# Patient Record
Sex: MALE | Race: WHITE | Employment: FULL TIME | URBAN - METROPOLITAN AREA
[De-identification: names, ages, dates, MRNs, and addresses within clinical notes are randomized per-mention and may not be internally consistent; named-entity substitution may affect disease eponyms.]

---

## 2018-08-16 ENCOUNTER — TELEPHONE (OUTPATIENT)
Dept: CARDIOLOGY CLINIC | Age: 63
End: 2018-08-16

## 2018-08-16 NOTE — TELEPHONE ENCOUNTER
Patient called stating he is having an upper GI. He is inquiring whether or not he needs antibiotic prophylaxis. Please advise.

## 2018-09-28 ENCOUNTER — HOSPITAL ENCOUNTER (OUTPATIENT)
Age: 63
Discharge: HOME | End: 2018-09-30
Payer: COMMERCIAL

## 2018-09-28 PROCEDURE — 88305 TISSUE EXAM BY PATHOLOGIST: CPT

## 2018-09-28 PROCEDURE — 87081 CULTURE SCREEN ONLY: CPT

## 2018-09-29 LAB — CLOTEST: NORMAL

## 2018-12-28 ENCOUNTER — HOSPITAL ENCOUNTER (OUTPATIENT)
Age: 63
Discharge: HOME | End: 2018-12-30

## 2018-12-28 PROCEDURE — 88305 TISSUE EXAM BY PATHOLOGIST: CPT

## 2018-12-28 PROCEDURE — 87081 CULTURE SCREEN ONLY: CPT

## 2018-12-29 LAB — CLOTEST: NORMAL

## 2019-01-08 ENCOUNTER — TELEPHONE (OUTPATIENT)
Dept: CARDIOLOGY CLINIC | Age: 64
End: 2019-01-08

## 2019-01-08 DIAGNOSIS — I35.0 NONRHEUMATIC AORTIC VALVE STENOSIS: ICD-10-CM

## 2019-01-08 DIAGNOSIS — Q23.1 BICUSPID AORTIC VALVE: Primary | ICD-10-CM

## 2019-03-04 ENCOUNTER — HOSPITAL ENCOUNTER (OUTPATIENT)
Dept: CARDIOLOGY | Age: 64
Discharge: HOME | End: 2019-03-04
Payer: COMMERCIAL

## 2019-03-04 DIAGNOSIS — I35.0 NONRHEUMATIC AORTIC VALVE STENOSIS: ICD-10-CM

## 2019-03-04 DIAGNOSIS — Q23.1 BICUSPID AORTIC VALVE: ICD-10-CM

## 2019-03-04 LAB
LV EF: 60 %
LVEF MODALITY: NORMAL

## 2019-03-04 PROCEDURE — 93306 TTE W/DOPPLER COMPLETE: CPT

## 2019-03-13 ENCOUNTER — TELEPHONE (OUTPATIENT)
Dept: CARDIOLOGY CLINIC | Age: 64
End: 2019-03-13

## 2019-03-13 DIAGNOSIS — I10 HYPERTENSION, UNSPECIFIED TYPE: ICD-10-CM

## 2019-03-13 DIAGNOSIS — R93.1 ABNORMAL ECHOCARDIOGRAM: Primary | ICD-10-CM

## 2019-03-13 DIAGNOSIS — I77.810 DILATED AORTIC ROOT (HCC): ICD-10-CM

## 2019-03-15 ENCOUNTER — HOSPITAL ENCOUNTER (OUTPATIENT)
Age: 64
Discharge: HOME | End: 2019-03-15
Payer: COMMERCIAL

## 2019-03-15 ENCOUNTER — HOSPITAL ENCOUNTER (OUTPATIENT)
Dept: CT IMAGING | Age: 64
Discharge: HOME | End: 2019-03-17
Payer: COMMERCIAL

## 2019-03-15 DIAGNOSIS — I77.810 DILATED AORTIC ROOT (HCC): ICD-10-CM

## 2019-03-15 DIAGNOSIS — R93.1 ABNORMAL ECHOCARDIOGRAM: ICD-10-CM

## 2019-03-15 DIAGNOSIS — I10 HYPERTENSION, UNSPECIFIED TYPE: ICD-10-CM

## 2019-03-15 LAB
ANION GAP SERPL CALCULATED.3IONS-SCNC: 10 MMOL/L (ref 7–16)
BUN BLDV-MCNC: 17 MG/DL (ref 8–23)
CALCIUM SERPL-MCNC: 9.2 MG/DL (ref 8.6–10.2)
CHLORIDE BLD-SCNC: 99 MMOL/L (ref 98–107)
CO2: 29 MMOL/L (ref 22–29)
CREAT SERPL-MCNC: 1.1 MG/DL (ref 0.7–1.2)
GFR AFRICAN AMERICAN: >60
GFR NON-AFRICAN AMERICAN: >60 ML/MIN/1.73
GLUCOSE BLD-MCNC: 87 MG/DL (ref 74–99)
POTASSIUM SERPL-SCNC: 4.4 MMOL/L (ref 3.5–5)
SODIUM BLD-SCNC: 138 MMOL/L (ref 132–146)

## 2019-03-15 PROCEDURE — 6360000004 HC RX CONTRAST MEDICATION: Performed by: RADIOLOGY

## 2019-03-15 PROCEDURE — 36415 COLL VENOUS BLD VENIPUNCTURE: CPT

## 2019-03-15 PROCEDURE — 80048 BASIC METABOLIC PNL TOTAL CA: CPT

## 2019-03-15 PROCEDURE — 71275 CT ANGIOGRAPHY CHEST: CPT

## 2019-03-15 RX ADMIN — IOPAMIDOL 80 ML: 755 INJECTION, SOLUTION INTRAVENOUS at 16:12

## 2019-03-18 ENCOUNTER — TELEPHONE (OUTPATIENT)
Dept: CARDIOLOGY CLINIC | Age: 64
End: 2019-03-18

## 2019-03-18 PROBLEM — I71.20 THORACIC AORTIC ANEURYSM WITHOUT RUPTURE (HCC): Status: ACTIVE | Noted: 2019-03-18

## 2019-07-09 ENCOUNTER — OFFICE VISIT (OUTPATIENT)
Dept: FAMILY MEDICINE CLINIC | Age: 64
End: 2019-07-09
Payer: COMMERCIAL

## 2019-07-09 VITALS
SYSTOLIC BLOOD PRESSURE: 120 MMHG | HEIGHT: 74 IN | WEIGHT: 193.6 LBS | DIASTOLIC BLOOD PRESSURE: 78 MMHG | HEART RATE: 63 BPM | TEMPERATURE: 96.2 F | BODY MASS INDEX: 24.85 KG/M2

## 2019-07-09 DIAGNOSIS — R19.7 DIARRHEA, UNSPECIFIED TYPE: Primary | ICD-10-CM

## 2019-07-09 PROCEDURE — 99213 OFFICE O/P EST LOW 20 MIN: CPT | Performed by: PHYSICIAN ASSISTANT

## 2019-07-09 RX ORDER — DICYCLOMINE HCL 20 MG
20 TABLET ORAL 3 TIMES DAILY PRN
Qty: 20 TABLET | Refills: 0 | Status: SHIPPED | OUTPATIENT
Start: 2019-07-09 | End: 2019-10-14 | Stop reason: ALTCHOICE

## 2019-07-09 RX ORDER — ONDANSETRON 4 MG/1
4 TABLET, FILM COATED ORAL DAILY PRN
Qty: 12 TABLET | Refills: 0 | Status: SHIPPED | OUTPATIENT
Start: 2019-07-09 | End: 2019-10-14 | Stop reason: ALTCHOICE

## 2019-10-14 ENCOUNTER — HOSPITAL ENCOUNTER (OUTPATIENT)
Age: 64
Discharge: HOME | End: 2019-10-16
Payer: COMMERCIAL

## 2019-10-14 ENCOUNTER — OFFICE VISIT (OUTPATIENT)
Dept: PRIMARY CARE CLINIC | Age: 64
End: 2019-10-14
Payer: COMMERCIAL

## 2019-10-14 VITALS
WEIGHT: 200 LBS | HEART RATE: 52 BPM | OXYGEN SATURATION: 96 % | DIASTOLIC BLOOD PRESSURE: 76 MMHG | TEMPERATURE: 97 F | BODY MASS INDEX: 25.68 KG/M2 | SYSTOLIC BLOOD PRESSURE: 120 MMHG

## 2019-10-14 DIAGNOSIS — I71.20 THORACIC AORTIC ANEURYSM WITHOUT RUPTURE: ICD-10-CM

## 2019-10-14 DIAGNOSIS — E78.5 HYPERLIPIDEMIA, UNSPECIFIED HYPERLIPIDEMIA TYPE: ICD-10-CM

## 2019-10-14 DIAGNOSIS — I35.0 NONRHEUMATIC AORTIC VALVE STENOSIS: ICD-10-CM

## 2019-10-14 DIAGNOSIS — I10 ESSENTIAL HYPERTENSION: ICD-10-CM

## 2019-10-14 DIAGNOSIS — R31.21 ASYMPTOMATIC MICROSCOPIC HEMATURIA: ICD-10-CM

## 2019-10-14 DIAGNOSIS — Z12.11 SPECIAL SCREENING FOR MALIGNANT NEOPLASMS, COLON: ICD-10-CM

## 2019-10-14 DIAGNOSIS — Q23.1 BICUSPID AORTIC VALVE: ICD-10-CM

## 2019-10-14 DIAGNOSIS — K21.00 GASTROESOPHAGEAL REFLUX DISEASE WITH ESOPHAGITIS: ICD-10-CM

## 2019-10-14 DIAGNOSIS — Z00.00 ANNUAL PHYSICAL EXAM: ICD-10-CM

## 2019-10-14 DIAGNOSIS — Z23 NEED FOR PNEUMOCOCCAL VACCINATION: Primary | ICD-10-CM

## 2019-10-14 LAB
ALBUMIN SERPL-MCNC: 4.2 G/DL (ref 3.5–5.2)
ALP BLD-CCNC: 95 U/L (ref 40–129)
ALT SERPL-CCNC: 14 U/L (ref 0–40)
ANION GAP SERPL CALCULATED.3IONS-SCNC: 13 MMOL/L (ref 7–16)
AST SERPL-CCNC: 22 U/L (ref 0–39)
BASOPHILS ABSOLUTE: 0.08 E9/L (ref 0–0.2)
BASOPHILS RELATIVE PERCENT: 1.6 % (ref 0–2)
BILIRUB SERPL-MCNC: 0.7 MG/DL (ref 0–1.2)
BILIRUBIN, POC: NORMAL
BLOOD URINE, POC: NORMAL
BUN BLDV-MCNC: 13 MG/DL (ref 8–23)
CALCIUM SERPL-MCNC: 9.2 MG/DL (ref 8.6–10.2)
CHLORIDE BLD-SCNC: 103 MMOL/L (ref 98–107)
CHOLESTEROL, TOTAL: 175 MG/DL (ref 0–199)
CLARITY, POC: CLEAR
CO2: 24 MMOL/L (ref 22–29)
COLOR, POC: YELLOW
CREAT SERPL-MCNC: 1.1 MG/DL (ref 0.7–1.2)
EOSINOPHILS ABSOLUTE: 0.1 E9/L (ref 0.05–0.5)
EOSINOPHILS RELATIVE PERCENT: 2 % (ref 0–6)
GFR AFRICAN AMERICAN: >60
GFR NON-AFRICAN AMERICAN: >60 ML/MIN/1.73
GLUCOSE BLD-MCNC: 102 MG/DL (ref 74–99)
GLUCOSE URINE, POC: NORMAL
HCT VFR BLD CALC: 47 % (ref 37–54)
HDLC SERPL-MCNC: 54 MG/DL
HEMOGLOBIN: 14.9 G/DL (ref 12.5–16.5)
IMMATURE GRANULOCYTES #: 0.01 E9/L
IMMATURE GRANULOCYTES %: 0.2 % (ref 0–5)
KETONES, POC: NORMAL
LDL CHOLESTEROL CALCULATED: 102 MG/DL (ref 0–99)
LEUKOCYTE EST, POC: NORMAL
LYMPHOCYTES ABSOLUTE: 1.47 E9/L (ref 1.5–4)
LYMPHOCYTES RELATIVE PERCENT: 29.2 % (ref 20–42)
MCH RBC QN AUTO: 31.8 PG (ref 26–35)
MCHC RBC AUTO-ENTMCNC: 31.7 % (ref 32–34.5)
MCV RBC AUTO: 100.2 FL (ref 80–99.9)
MONOCYTES ABSOLUTE: 0.57 E9/L (ref 0.1–0.95)
MONOCYTES RELATIVE PERCENT: 11.3 % (ref 2–12)
NEUTROPHILS ABSOLUTE: 2.8 E9/L (ref 1.8–7.3)
NEUTROPHILS RELATIVE PERCENT: 55.7 % (ref 43–80)
NITRITE, POC: NORMAL
PDW BLD-RTO: 12.5 FL (ref 11.5–15)
PH, POC: 6
PLATELET # BLD: 206 E9/L (ref 130–450)
PMV BLD AUTO: 11.4 FL (ref 7–12)
POTASSIUM SERPL-SCNC: 5 MMOL/L (ref 3.5–5)
PROSTATE SPECIFIC ANTIGEN: 0.44 NG/ML (ref 0–4)
PROTEIN, POC: NORMAL
RBC # BLD: 4.69 E12/L (ref 3.8–5.8)
SODIUM BLD-SCNC: 140 MMOL/L (ref 132–146)
SPECIFIC GRAVITY, POC: 1.02
T4 TOTAL: 6.9 MCG/DL (ref 4.5–11.7)
TOTAL PROTEIN: 6.9 G/DL (ref 6.4–8.3)
TRIGL SERPL-MCNC: 95 MG/DL (ref 0–149)
TSH SERPL DL<=0.05 MIU/L-ACNC: 2.32 UIU/ML (ref 0.27–4.2)
UROBILINOGEN, POC: 0.2
VLDLC SERPL CALC-MCNC: 19 MG/DL
WBC # BLD: 5 E9/L (ref 4.5–11.5)

## 2019-10-14 PROCEDURE — 81002 URINALYSIS NONAUTO W/O SCOPE: CPT | Performed by: FAMILY MEDICINE

## 2019-10-14 PROCEDURE — 36415 COLL VENOUS BLD VENIPUNCTURE: CPT

## 2019-10-14 PROCEDURE — 85025 COMPLETE CBC W/AUTO DIFF WBC: CPT

## 2019-10-14 PROCEDURE — 93000 ELECTROCARDIOGRAM COMPLETE: CPT | Performed by: FAMILY MEDICINE

## 2019-10-14 PROCEDURE — 84436 ASSAY OF TOTAL THYROXINE: CPT

## 2019-10-14 PROCEDURE — 84443 ASSAY THYROID STIM HORMONE: CPT

## 2019-10-14 PROCEDURE — 80053 COMPREHEN METABOLIC PANEL: CPT

## 2019-10-14 PROCEDURE — 90670 PCV13 VACCINE IM: CPT | Performed by: FAMILY MEDICINE

## 2019-10-14 PROCEDURE — 80061 LIPID PANEL: CPT

## 2019-10-14 PROCEDURE — G0103 PSA SCREENING: HCPCS

## 2019-10-14 PROCEDURE — 99397 PER PM REEVAL EST PAT 65+ YR: CPT | Performed by: FAMILY MEDICINE

## 2019-10-14 PROCEDURE — 90471 IMMUNIZATION ADMIN: CPT | Performed by: FAMILY MEDICINE

## 2019-10-14 RX ORDER — PANTOPRAZOLE SODIUM 40 MG/1
TABLET, DELAYED RELEASE ORAL
Refills: 2 | COMMUNITY
Start: 2019-09-14 | End: 2019-11-18 | Stop reason: SDUPTHER

## 2019-10-14 RX ORDER — TERAZOSIN 5 MG/1
5 CAPSULE ORAL NIGHTLY
Qty: 90 CAPSULE | Refills: 3 | Status: SHIPPED
Start: 2019-10-14 | End: 2020-04-16 | Stop reason: SDUPTHER

## 2019-10-14 RX ORDER — METOPROLOL SUCCINATE 25 MG/1
25 TABLET, EXTENDED RELEASE ORAL DAILY
Qty: 90 TABLET | Refills: 3 | Status: SHIPPED
Start: 2019-10-14 | End: 2020-04-16 | Stop reason: SDUPTHER

## 2019-10-14 RX ORDER — ATORVASTATIN CALCIUM 10 MG/1
10 TABLET, FILM COATED ORAL DAILY
Qty: 90 TABLET | Refills: 3 | Status: SHIPPED
Start: 2019-10-14 | End: 2020-04-16 | Stop reason: SDUPTHER

## 2019-10-14 ASSESSMENT — PATIENT HEALTH QUESTIONNAIRE - PHQ9
SUM OF ALL RESPONSES TO PHQ QUESTIONS 1-9: 0
1. LITTLE INTEREST OR PLEASURE IN DOING THINGS: 0
SUM OF ALL RESPONSES TO PHQ9 QUESTIONS 1 & 2: 0
SUM OF ALL RESPONSES TO PHQ QUESTIONS 1-9: 0
2. FEELING DOWN, DEPRESSED OR HOPELESS: 0

## 2019-10-14 ASSESSMENT — ENCOUNTER SYMPTOMS
ALLERGIC/IMMUNOLOGIC NEGATIVE: 1
EYES NEGATIVE: 1
GASTROINTESTINAL NEGATIVE: 1
RESPIRATORY NEGATIVE: 1

## 2019-11-13 PROBLEM — Z12.11 SPECIAL SCREENING FOR MALIGNANT NEOPLASMS, COLON: Status: RESOLVED | Noted: 2019-10-14 | Resolved: 2019-11-13

## 2019-11-18 ENCOUNTER — TELEPHONE (OUTPATIENT)
Dept: PRIMARY CARE CLINIC | Age: 64
End: 2019-11-18

## 2019-11-18 DIAGNOSIS — M25.561 PAIN IN BOTH KNEES, UNSPECIFIED CHRONICITY: Primary | ICD-10-CM

## 2019-11-18 DIAGNOSIS — M25.562 PAIN IN BOTH KNEES, UNSPECIFIED CHRONICITY: Primary | ICD-10-CM

## 2019-11-18 RX ORDER — PANTOPRAZOLE SODIUM 40 MG/1
TABLET, DELAYED RELEASE ORAL
Qty: 30 TABLET | Refills: 5 | Status: SHIPPED
Start: 2019-11-18 | End: 2020-04-16 | Stop reason: SDUPTHER

## 2020-04-16 RX ORDER — ATORVASTATIN CALCIUM 10 MG/1
10 TABLET, FILM COATED ORAL DAILY
Qty: 90 TABLET | Refills: 3 | Status: SHIPPED
Start: 2020-04-16 | End: 2021-04-05

## 2020-04-16 RX ORDER — PANTOPRAZOLE SODIUM 40 MG/1
40 TABLET, DELAYED RELEASE ORAL DAILY
Qty: 90 TABLET | Refills: 3 | Status: SHIPPED
Start: 2020-04-16 | End: 2021-04-05

## 2020-04-16 RX ORDER — TERAZOSIN 5 MG/1
5 CAPSULE ORAL NIGHTLY
Qty: 90 CAPSULE | Refills: 3 | Status: SHIPPED
Start: 2020-04-16 | End: 2021-04-05

## 2020-04-16 RX ORDER — METOPROLOL SUCCINATE 25 MG/1
25 TABLET, EXTENDED RELEASE ORAL DAILY
Qty: 90 TABLET | Refills: 3 | Status: SHIPPED
Start: 2020-04-16 | End: 2021-12-20 | Stop reason: SDUPTHER

## 2020-06-09 ENCOUNTER — TELEPHONE (OUTPATIENT)
Dept: ADMINISTRATIVE | Age: 65
End: 2020-06-09

## 2020-06-29 ENCOUNTER — TELEPHONE (OUTPATIENT)
Dept: CARDIOLOGY | Age: 65
End: 2020-06-29

## 2020-06-29 NOTE — TELEPHONE ENCOUNTER
SCHEDULED ECHO FOR 07-27-20. REVIEWED COVID CHECKLIST WITH PATIENT.   OFFICE VISIT WITH DR. Annabella Brown AT 8:45AM  Electronically signed by Katrina Rodgers on 6/29/2020 at 8:48 AM

## 2020-07-23 ENCOUNTER — TELEPHONE (OUTPATIENT)
Dept: CARDIOLOGY | Age: 65
End: 2020-07-23

## 2020-07-27 ENCOUNTER — OFFICE VISIT (OUTPATIENT)
Dept: CARDIOLOGY CLINIC | Age: 65
End: 2020-07-27
Payer: MEDICARE

## 2020-07-27 ENCOUNTER — HOSPITAL ENCOUNTER (OUTPATIENT)
Dept: CARDIOLOGY | Age: 65
Discharge: HOME | End: 2020-07-27
Payer: MEDICARE

## 2020-07-27 VITALS
BODY MASS INDEX: 27.32 KG/M2 | HEIGHT: 73 IN | TEMPERATURE: 97.6 F | HEART RATE: 44 BPM | DIASTOLIC BLOOD PRESSURE: 80 MMHG | RESPIRATION RATE: 16 BRPM | SYSTOLIC BLOOD PRESSURE: 102 MMHG | WEIGHT: 206.1 LBS

## 2020-07-27 LAB
LV EF: 55 %
LVEF MODALITY: NORMAL

## 2020-07-27 PROCEDURE — 4040F PNEUMOC VAC/ADMIN/RCVD: CPT | Performed by: INTERNAL MEDICINE

## 2020-07-27 PROCEDURE — 99214 OFFICE O/P EST MOD 30 MIN: CPT | Performed by: INTERNAL MEDICINE

## 2020-07-27 PROCEDURE — G8427 DOCREV CUR MEDS BY ELIG CLIN: HCPCS | Performed by: INTERNAL MEDICINE

## 2020-07-27 PROCEDURE — 1123F ACP DISCUSS/DSCN MKR DOCD: CPT | Performed by: INTERNAL MEDICINE

## 2020-07-27 PROCEDURE — 3017F COLORECTAL CA SCREEN DOC REV: CPT | Performed by: INTERNAL MEDICINE

## 2020-07-27 PROCEDURE — 93306 TTE W/DOPPLER COMPLETE: CPT | Performed by: PSYCHIATRY & NEUROLOGY

## 2020-07-27 PROCEDURE — G8417 CALC BMI ABV UP PARAM F/U: HCPCS | Performed by: INTERNAL MEDICINE

## 2020-07-27 PROCEDURE — 4004F PT TOBACCO SCREEN RCVD TLK: CPT | Performed by: INTERNAL MEDICINE

## 2020-07-27 PROCEDURE — 93000 ELECTROCARDIOGRAM COMPLETE: CPT | Performed by: INTERNAL MEDICINE

## 2020-07-27 NOTE — PROGRESS NOTES
Sánchez Rad  [birth year]  Date of Service: 7/27/2020    Patient Active Problem List    Diagnosis Date Noted    Need for pneumococcal vaccination 10/14/2019    Gastroesophageal reflux disease with esophagitis 10/14/2019    Hyperlipidemia 10/14/2019    Essential hypertension 10/14/2019    Asymptomatic microscopic hematuria 10/14/2019    Thoracic aortic aneurysm without rupture ([facility] [address] 03/18/2019     Overview Note:     Ascending: 3.8 x 3.9 cm on CTA      Bicuspid aortic valve     Aortic stenosis      Overview Note:     mild      Aortic regurgitation      Overview Note:     mild to moderate         Social History     Socioeconomic History    Marital status:      Spouse name: Not on file    Number of children: Not on file    Years of education: Not on file    Highest education level: Not on file   Occupational History    Not on file   Social Needs    Financial resource strain: Not on file    Food insecurity     Worry: Not on file     Inability: Not on file    Transportation needs     Medical: Not on file     Non-medical: Not on file   Tobacco Use    Smoking status: Never Smoker    Smokeless tobacco: Never Used   Substance and Sexual Activity    Alcohol use: Yes     Comment: Two drinks twice weekly    Drug use: No    Sexual activity: Not on file   Lifestyle    Physical activity     Days per week: Not on file     Minutes per session: Not on file    Stress: Not on file   Relationships    Social connections     Talks on phone: Not on file     Gets together: Not on file     Attends Restorationism service: Not on file     Active member of club or organization: Not on file     Attends meetings of clubs or organizations: Not on file     Relationship status: Not on file    Intimate partner violence     Fear of current or ex partner: Not on file     Emotionally abused: Not on file     Physically abused: Not on file     Forced sexual activity: Not on file   Other Topics Concern    Not on file   Social History Narrative    Not on file       Current Outpatient Medications   Medication Sig Dispense Refill    metoprolol succinate (TOPROL XL) 25 MG extended release tablet Take 1 tablet by mouth daily 90 tablet 3    atorvastatin (LIPITOR) 10 MG tablet Take 1 tablet by mouth daily 90 tablet 3    terazosin (HYTRIN) 5 MG capsule Take 1 capsule by mouth nightly 90 capsule 3    pantoprazole (PROTONIX) 40 MG tablet Take 1 tablet by mouth daily 90 tablet 3    aspirin 81 MG EC tablet Take 81 mg by mouth daily.  Multiple Vitamins-Minerals (MULTIVITAMIN PO) Take  by mouth daily. No current facility-administered medications for this visit. No Known Allergies    Chief Complaint:  Tonia Mota is here today for follow up and management/recomendations for bicuspid AV, AR, AS, HTN. History of Present Illness: Tonia Mota states that He does house work , goes up the stairs, does yard work & goes shopping. He also walks 2.5 to 3 miles. He denies any chest discomfort, dyspnea on exertion, orthopnea/PND, or lower extremity edema. He denies any palpitations or presyncopal symptoms. REVIEW OF SYSTEMS:  As above. Patient does not complain of any fever, chills, nausea, vomiting or diarrhea. No focal, motor or neurological deficits. No changes in his/her vision, hearing, bowel or bladder habits. He is not known to have a history of thyroid problems. No recent nose bleeds. PHYSICAL EXAM:  Vitals:    07/27/20 0858   BP: 102/80   Pulse: (!) 44   Resp: 16   Temp: 97.6 °F (36.4 °C)   Weight: 206 lb 1.6 oz (93.5 kg)   Height: 6' 1\" (1.854 m)       GENERAL:  He is alert and oriented x 3, communicates well, in no distress. NECK:  No masses, trachea is mid position. Supple, full ROM, no JVD or bruits. No palpable thyromegaly or lymphadenopathy. HEART:  Regular rate and rhythm. Normal S1 and S2. There is an S4 gallop and a II/VI mid peaking systolic ejection murmur.     LUNGS:  Clear to auscultation bilaterally. No use of accessory muscles. symmetrical excursion. ABDOMEN:  Soft, non-tender. Normal bowel sounds. Obese. EXTREMITIES:  Full ROM x 4. No bilateral lower extremity edema. Good distal pulses. EYES:  Extraocular muscles intact. PERRL. Normal lids & conjunctiva. ENT:  Nares are clear & not bleeding. Moist mucosa. Normal lips formation. No external masses   NEURO: no tremors, full ROM x 4, EOMI. SKIN:  Warm, dry and intact. Normal turgor. EKG: Sinus dana, 44 bpm, nl axis, nonspecific ST - T wave changes. Assessment:   1. bicuspid aortic valve  2. MildTo moderate AS. Mean gradient 17 mmHg. Unchanged on his echo from Friday. 3. Mild to moderate AR. Unchanged as well. 4. sinus Bradycardia. 5. Palpitations resolved. 6. Hypertension, well controled at this time. 7. Hypercholesterolemia        Recommendations:  1. He is following the cholesterol with Dr. Bobby Sauer. 2. I reviewed his echo findings with him today. 3. Decrease metoprolol to 12.5 mg daily. 4. Endocarditis prophylaxis. Thank you for allowing me to participate in your patient's care.       [id number], [address] [facility]  Interventional Cardiology

## 2020-10-12 ENCOUNTER — HOSPITAL ENCOUNTER (OUTPATIENT)
Age: 65
Discharge: HOME | End: 2020-10-14

## 2020-10-12 PROCEDURE — 87081 CULTURE SCREEN ONLY: CPT

## 2020-10-12 PROCEDURE — 88305 TISSUE EXAM BY PATHOLOGIST: CPT

## 2020-10-13 LAB — CLOTEST: NORMAL

## 2020-10-14 ENCOUNTER — TELEPHONE (OUTPATIENT)
Dept: PRIMARY CARE CLINIC | Age: 65
End: 2020-10-14

## 2020-10-23 ENCOUNTER — HOSPITAL ENCOUNTER (OUTPATIENT)
Age: 65
Discharge: HOME | End: 2020-10-25
Payer: MEDICARE

## 2020-10-23 LAB
ALBUMIN SERPL-MCNC: 4.2 G/DL (ref 3.5–5.2)
ALP BLD-CCNC: 84 U/L (ref 40–129)
ALT SERPL-CCNC: 13 U/L (ref 0–40)
ANION GAP SERPL CALCULATED.3IONS-SCNC: 14 MMOL/L (ref 7–16)
AST SERPL-CCNC: 19 U/L (ref 0–39)
BASOPHILS ABSOLUTE: 0.1 E9/L (ref 0–0.2)
BASOPHILS RELATIVE PERCENT: 1.9 % (ref 0–2)
BILIRUB SERPL-MCNC: 0.7 MG/DL (ref 0–1.2)
BUN BLDV-MCNC: 10 MG/DL (ref 8–23)
CALCIUM SERPL-MCNC: 9.2 MG/DL (ref 8.6–10.2)
CHLORIDE BLD-SCNC: 102 MMOL/L (ref 98–107)
CHOLESTEROL, TOTAL: 181 MG/DL (ref 0–199)
CO2: 26 MMOL/L (ref 22–29)
CREAT SERPL-MCNC: 1.2 MG/DL (ref 0.7–1.2)
EOSINOPHILS ABSOLUTE: 0.08 E9/L (ref 0.05–0.5)
EOSINOPHILS RELATIVE PERCENT: 1.5 % (ref 0–6)
GFR AFRICAN AMERICAN: >60
GFR NON-AFRICAN AMERICAN: >60 ML/MIN/1.73
GLUCOSE BLD-MCNC: 98 MG/DL (ref 74–99)
HCT VFR BLD CALC: 45.8 % (ref 37–54)
HDLC SERPL-MCNC: 51 MG/DL
HEMOGLOBIN: 14.9 G/DL (ref 12.5–16.5)
IMMATURE GRANULOCYTES #: 0.01 E9/L
IMMATURE GRANULOCYTES %: 0.2 % (ref 0–5)
LDL CHOLESTEROL CALCULATED: 112 MG/DL (ref 0–99)
LYMPHOCYTES ABSOLUTE: 1.53 E9/L (ref 1.5–4)
LYMPHOCYTES RELATIVE PERCENT: 29.2 % (ref 20–42)
MCH RBC QN AUTO: 32.2 PG (ref 26–35)
MCHC RBC AUTO-ENTMCNC: 32.5 % (ref 32–34.5)
MCV RBC AUTO: 98.9 FL (ref 80–99.9)
MONOCYTES ABSOLUTE: 0.5 E9/L (ref 0.1–0.95)
MONOCYTES RELATIVE PERCENT: 9.5 % (ref 2–12)
NEUTROPHILS ABSOLUTE: 3.02 E9/L (ref 1.8–7.3)
NEUTROPHILS RELATIVE PERCENT: 57.7 % (ref 43–80)
PDW BLD-RTO: 12 FL (ref 11.5–15)
PLATELET # BLD: 204 E9/L (ref 130–450)
PMV BLD AUTO: 11.2 FL (ref 7–12)
POTASSIUM SERPL-SCNC: 4.5 MMOL/L (ref 3.5–5)
PROSTATE SPECIFIC ANTIGEN: 0.5 NG/ML (ref 0–4)
RBC # BLD: 4.63 E12/L (ref 3.8–5.8)
SODIUM BLD-SCNC: 142 MMOL/L (ref 132–146)
T4 TOTAL: 7.6 MCG/DL (ref 4.5–11.7)
TOTAL PROTEIN: 6.4 G/DL (ref 6.4–8.3)
TRIGL SERPL-MCNC: 91 MG/DL (ref 0–149)
TSH SERPL DL<=0.05 MIU/L-ACNC: 2.47 UIU/ML (ref 0.27–4.2)
VLDLC SERPL CALC-MCNC: 18 MG/DL
WBC # BLD: 5.2 E9/L (ref 4.5–11.5)

## 2020-10-23 PROCEDURE — 80053 COMPREHEN METABOLIC PANEL: CPT

## 2020-10-23 PROCEDURE — 36415 COLL VENOUS BLD VENIPUNCTURE: CPT

## 2020-10-23 PROCEDURE — G0103 PSA SCREENING: HCPCS

## 2020-10-23 PROCEDURE — 84436 ASSAY OF TOTAL THYROXINE: CPT

## 2020-10-23 PROCEDURE — 80061 LIPID PANEL: CPT

## 2020-10-23 PROCEDURE — 84443 ASSAY THYROID STIM HORMONE: CPT

## 2020-10-23 PROCEDURE — 85025 COMPLETE CBC W/AUTO DIFF WBC: CPT

## 2020-11-02 ENCOUNTER — OFFICE VISIT (OUTPATIENT)
Dept: PRIMARY CARE CLINIC | Age: 65
End: 2020-11-02
Payer: MEDICARE

## 2020-11-02 VITALS
OXYGEN SATURATION: 98 % | DIASTOLIC BLOOD PRESSURE: 60 MMHG | SYSTOLIC BLOOD PRESSURE: 98 MMHG | BODY MASS INDEX: 26.12 KG/M2 | TEMPERATURE: 97.1 F | HEART RATE: 44 BPM | WEIGHT: 198 LBS

## 2020-11-02 PROBLEM — Z00.00 ANNUAL PHYSICAL EXAM: Status: ACTIVE | Noted: 2020-11-02

## 2020-11-02 LAB
BILIRUBIN, POC: NORMAL
BLOOD URINE, POC: NORMAL
CLARITY, POC: CLEAR
COLOR, POC: YELLOW
GLUCOSE URINE, POC: NORMAL
KETONES, POC: NORMAL
LEUKOCYTE EST, POC: NORMAL
NITRITE, POC: NORMAL
PH, POC: 5.5
PROTEIN, POC: NORMAL
SPECIFIC GRAVITY, POC: 1.02
UROBILINOGEN, POC: 0.2

## 2020-11-02 PROCEDURE — 90471 IMMUNIZATION ADMIN: CPT | Performed by: FAMILY MEDICINE

## 2020-11-02 PROCEDURE — 90732 PPSV23 VACC 2 YRS+ SUBQ/IM: CPT | Performed by: FAMILY MEDICINE

## 2020-11-02 PROCEDURE — G0009 ADMIN PNEUMOCOCCAL VACCINE: HCPCS | Performed by: FAMILY MEDICINE

## 2020-11-02 PROCEDURE — 81002 URINALYSIS NONAUTO W/O SCOPE: CPT | Performed by: FAMILY MEDICINE

## 2020-11-02 PROCEDURE — G0438 PPPS, INITIAL VISIT: HCPCS | Performed by: FAMILY MEDICINE

## 2020-11-02 PROCEDURE — 90715 TDAP VACCINE 7 YRS/> IM: CPT | Performed by: FAMILY MEDICINE

## 2020-11-02 ASSESSMENT — PATIENT HEALTH QUESTIONNAIRE - PHQ9
1. LITTLE INTEREST OR PLEASURE IN DOING THINGS: 0
SUM OF ALL RESPONSES TO PHQ QUESTIONS 1-9: 0
SUM OF ALL RESPONSES TO PHQ QUESTIONS 1-9: 0
SUM OF ALL RESPONSES TO PHQ9 QUESTIONS 1 & 2: 0
SUM OF ALL RESPONSES TO PHQ QUESTIONS 1-9: 0
2. FEELING DOWN, DEPRESSED OR HOPELESS: 0

## 2020-11-02 ASSESSMENT — ENCOUNTER SYMPTOMS
ALLERGIC/IMMUNOLOGIC NEGATIVE: 1
RESPIRATORY NEGATIVE: 1
EYES NEGATIVE: 1
GASTROINTESTINAL NEGATIVE: 1

## 2020-11-02 NOTE — PROGRESS NOTES
20     Amilcar Jha    : 1955 Sex: male   Age: 76 y.o. Chief Complaint   Patient presents with    Annual Exam     saw dr Paz Najera and already had ekg    Discuss Labs       Prior to Admission medications    Medication Sig Start Date End Date Taking? Authorizing Provider   metoprolol succinate (TOPROL XL) 25 MG extended release tablet Take 1 tablet by mouth daily  Patient taking differently: Take 25 mg by mouth daily 1/2 20  Yes Efrain Wilson, DO   atorvastatin (LIPITOR) 10 MG tablet Take 1 tablet by mouth daily 20  Yes Radha Conner, DO   terazosin (HYTRIN) 5 MG capsule Take 1 capsule by mouth nightly 20  Yes Efrain Wilson, DO   pantoprazole (PROTONIX) 40 MG tablet Take 1 tablet by mouth daily 20  Yes Efrain Wilson, DO   aspirin 81 MG EC tablet Take 81 mg by mouth daily. Yes Historical Provider, MD   Multiple Vitamins-Minerals (MULTIVITAMIN PO) Take  by mouth daily. Yes Historical Provider, MD          HPI: Yany Muhammad seen today complete physical.  Hypertension peripheral vascular disease hyperlipidemia reflux disease aortic valve disease all of which is been stable. Medications well-tolerated. Systems review stable. Review of Systems   Constitutional: Negative. HENT: Negative. Eyes: Negative. Respiratory: Negative. Gastrointestinal: Negative. Endocrine: Negative. Genitourinary: Negative. Musculoskeletal: Negative. Skin: Negative. Allergic/Immunologic: Negative. Neurological: Negative. Hematological: Negative. Psychiatric/Behavioral: Negative.                Current Outpatient Medications:     metoprolol succinate (TOPROL XL) 25 MG extended release tablet, Take 1 tablet by mouth daily (Patient taking differently: Take 25 mg by mouth daily 1/2), Disp: 90 tablet, Rfl: 3    atorvastatin (LIPITOR) 10 MG tablet, Take 1 tablet by mouth daily, Disp: 90 tablet, Rfl: 3    terazosin (HYTRIN) 5 MG capsule, Take 1 capsule by mouth nightly, Disp: 90 capsule, Rfl: 3    pantoprazole (PROTONIX) 40 MG tablet, Take 1 tablet by mouth daily, Disp: 90 tablet, Rfl: 3    aspirin 81 MG EC tablet, Take 81 mg by mouth daily. , Disp: , Rfl:     Multiple Vitamins-Minerals (MULTIVITAMIN PO), Take  by mouth daily. , Disp: , Rfl:     No Known Allergies    Social History     Tobacco Use    Smoking status: Never Smoker    Smokeless tobacco: Never Used   Substance Use Topics    Alcohol use: Yes     Comment: Two drinks twice weekly    Drug use: No      No past surgical history on file. No family history on file. Past Medical History:   Diagnosis Date    Aortic regurgitation     mild to moderate    Aortic stenosis     mild    Basal cell carcinoma     Bicuspid aortic valve     Hypertension        Vitals:    11/02/20 0948   BP: 98/60   Pulse: (!) 44   Temp: 97.1 °F (36.2 °C)   SpO2: 98%   Weight: 198 lb (89.8 kg)     BP Readings from Last 3 Encounters:   11/02/20 98/60   07/27/20 102/80   10/14/19 120/76      136/80  Physical Exam  Vitals signs and nursing note reviewed. Constitutional:       Appearance: He is well-developed. HENT:      Head: Normocephalic. Right Ear: External ear normal.      Left Ear: External ear normal.      Nose: Nose normal.   Eyes:      Conjunctiva/sclera: Conjunctivae normal.      Pupils: Pupils are equal, round, and reactive to light. Neck:      Musculoskeletal: Normal range of motion and neck supple. Cardiovascular:      Rate and Rhythm: Normal rate. Pulmonary:      Breath sounds: Normal breath sounds. Abdominal:      General: Bowel sounds are normal.      Palpations: Abdomen is soft. Musculoskeletal: Normal range of motion. Skin:     General: Skin is warm and dry. Neurological:      Mental Status: He is alert and oriented to person, place, and time. Psychiatric:         Behavior: Behavior normal.     Today's vitals physical examination stable. Medications as prescribed. Labs reviewed are stable. Reassessment 6 months and sooner if problems. Lab Results   Component Value Date    TSH 2.470 10/23/2020    TSH 2.320 10/14/2019    [id number] 7.6 10/23/2020    P3VRCQC 6.9 10/14/2019     Lab Results   Component Value Date    CHOL 181 10/23/2020    CHOL 175 10/14/2019     Lab Results   Component Value Date    TRIG 91 10/23/2020    TRIG 95 10/14/2019     Lab Results   Component Value Date    HDL 51 10/23/2020    HDL 54 10/14/2019     No results found for: [facility]  Lab Results   Component Value Date    LABVLDL 18 10/23/2020    LABVLDL 19 10/14/2019     No results found for: [facility]  Lab Results   Component Value Date    WBC 5.2 10/23/2020    HGB 14.9 10/23/2020    HCT 45.8 10/23/2020    MCV 98.9 10/23/2020     10/23/2020    LYMPHOPCT 29.2 10/23/2020    RBC 4.63 10/23/2020    MCH 32.2 10/23/2020    MCHC 32.5 10/23/2020    RDW 12.0 10/23/2020     Lab Results   Component Value Date     10/23/2020    K 4.5 10/23/2020     10/23/2020    CO2 26 10/23/2020    BUN 10 10/23/2020    CREATININE 1.2 10/23/2020    GLUCOSE 98 10/23/2020    CALCIUM 9.2 10/23/2020    PROT 6.4 10/23/2020    LABALBU 4.2 10/23/2020    BILITOT 0.7 10/23/2020    ALKPHOS 84 10/23/2020    AST 19 10/23/2020    ALT 13 10/23/2020    LABGLOM >60 10/23/2020    GFRAA >60 10/23/2020        Lab Results   Component Value Date    PSA 0.50 10/23/2020    PSA 0.44 10/14/2019      No results found for: LABA1C  No results found for: EAG           Plan Per Assessment:  Emir Jeffrey was seen today for annual exam and discuss labs.     Diagnoses and all orders for this visit:    Essential hypertension    Thoracic aortic aneurysm without rupture (HCC)    Hyperlipidemia, unspecified hyperlipidemia type    Gastroesophageal reflux disease with esophagitis without hemorrhage    Nonrheumatic aortic valve stenosis    Other orders  -     Pneumococcal polysaccharide vaccine 23-valent greater than or equal to 1yo subcutaneous/IM  -     Tdap (age 6y and older) IM ([employer])            No follow-ups on file. Gar Alert, DO    Note was generated with the assistance of voice recognition software. Document was reviewed however may contain grammatical errors.

## 2020-12-02 PROBLEM — Z00.00 ANNUAL PHYSICAL EXAM: Status: RESOLVED | Noted: 2020-11-02 | Resolved: 2020-12-02

## 2021-04-05 RX ORDER — TERAZOSIN 5 MG/1
CAPSULE ORAL
Qty: 90 CAPSULE | Refills: 3 | Status: SHIPPED
Start: 2021-04-05 | End: 2021-10-25 | Stop reason: SDUPTHER

## 2021-04-05 RX ORDER — PANTOPRAZOLE SODIUM 40 MG/1
TABLET, DELAYED RELEASE ORAL
Qty: 90 TABLET | Refills: 3 | Status: SHIPPED
Start: 2021-04-05 | End: 2021-10-25 | Stop reason: SDUPTHER

## 2021-04-05 RX ORDER — ATORVASTATIN CALCIUM 10 MG/1
TABLET, FILM COATED ORAL
Qty: 90 TABLET | Refills: 3 | Status: SHIPPED
Start: 2021-04-05 | End: 2021-10-25 | Stop reason: SDUPTHER

## 2021-05-19 ENCOUNTER — TELEPHONE (OUTPATIENT)
Dept: CARDIOLOGY CLINIC | Age: 66
End: 2021-05-19

## 2021-05-21 DIAGNOSIS — I35.0 AORTIC VALVE STENOSIS, ETIOLOGY OF CARDIAC VALVE DISEASE UNSPECIFIED: Primary | ICD-10-CM

## 2021-05-21 NOTE — TELEPHONE ENCOUNTER
Order placed for echo. Will try to arrange for echo and F/U same day at patient's request as he will be coming from [place].

## 2021-07-19 ENCOUNTER — TELEPHONE (OUTPATIENT)
Dept: CARDIOLOGY CLINIC | Age: 66
End: 2021-07-19

## 2021-07-19 ENCOUNTER — HOSPITAL ENCOUNTER (OUTPATIENT)
Dept: CARDIOLOGY | Age: 66
Discharge: HOME | End: 2021-07-19
Payer: MEDICARE

## 2021-07-19 ENCOUNTER — OFFICE VISIT (OUTPATIENT)
Dept: CARDIOLOGY CLINIC | Age: 66
End: 2021-07-19
Payer: MEDICARE

## 2021-07-19 VITALS
HEART RATE: 48 BPM | DIASTOLIC BLOOD PRESSURE: 88 MMHG | SYSTOLIC BLOOD PRESSURE: 118 MMHG | RESPIRATION RATE: 18 BRPM | BODY MASS INDEX: 27.16 KG/M2 | HEIGHT: 71 IN | WEIGHT: 194 LBS

## 2021-07-19 DIAGNOSIS — I10 ESSENTIAL HYPERTENSION: Primary | ICD-10-CM

## 2021-07-19 DIAGNOSIS — I35.0 AORTIC VALVE STENOSIS, ETIOLOGY OF CARDIAC VALVE DISEASE UNSPECIFIED: ICD-10-CM

## 2021-07-19 DIAGNOSIS — Q23.1 BICUSPID AORTIC VALVE: Primary | ICD-10-CM

## 2021-07-19 DIAGNOSIS — I71.20 THORACIC AORTIC ANEURYSM WITHOUT RUPTURE: ICD-10-CM

## 2021-07-19 LAB
LV EF: 55 %
LVEF MODALITY: NORMAL

## 2021-07-19 PROCEDURE — 1123F ACP DISCUSS/DSCN MKR DOCD: CPT | Performed by: INTERNAL MEDICINE

## 2021-07-19 PROCEDURE — 3017F COLORECTAL CA SCREEN DOC REV: CPT | Performed by: INTERNAL MEDICINE

## 2021-07-19 PROCEDURE — 4040F PNEUMOC VAC/ADMIN/RCVD: CPT | Performed by: INTERNAL MEDICINE

## 2021-07-19 PROCEDURE — G8427 DOCREV CUR MEDS BY ELIG CLIN: HCPCS | Performed by: INTERNAL MEDICINE

## 2021-07-19 PROCEDURE — 93306 TTE W/DOPPLER COMPLETE: CPT

## 2021-07-19 PROCEDURE — 99214 OFFICE O/P EST MOD 30 MIN: CPT | Performed by: INTERNAL MEDICINE

## 2021-07-19 PROCEDURE — G8417 CALC BMI ABV UP PARAM F/U: HCPCS | Performed by: INTERNAL MEDICINE

## 2021-07-19 PROCEDURE — 93000 ELECTROCARDIOGRAM COMPLETE: CPT | Performed by: INTERNAL MEDICINE

## 2021-07-19 PROCEDURE — 1036F TOBACCO NON-USER: CPT | Performed by: INTERNAL MEDICINE

## 2021-07-19 NOTE — PROGRESS NOTES
Maria L Perez  [birth year]  Date of Service: 7/19/2021    Patient Active Problem List    Diagnosis Date Noted    Need for pneumococcal vaccination 10/14/2019    Gastroesophageal reflux disease with esophagitis 10/14/2019    Hyperlipidemia 10/14/2019    Essential hypertension 10/14/2019    Asymptomatic microscopic hematuria 10/14/2019    Thoracic aortic aneurysm without rupture ([facility] [address] 03/18/2019     Overview Note:     Ascending: 3.8 x 3.9 cm on CTA      Bicuspid aortic valve     Aortic stenosis      Overview Note:     mild      Aortic regurgitation      Overview Note:     mild to moderate         Social History     Socioeconomic History    Marital status:      Spouse name: None    Number of children: None    Years of education: None    Highest education level: None   Occupational History    None   Tobacco Use    Smoking status: Never Smoker    Smokeless tobacco: Never Used   Substance and Sexual Activity    Alcohol use: Yes     Comment: Two drinks twice weekly    Drug use: No    Sexual activity: None   Other Topics Concern    None   Social History Narrative    None     Social Determinants of Health     Financial Resource Strain:     Difficulty of Paying Living Expenses:    Food Insecurity:     Worried About Running Out of Food in the Last Year:     Ran Out of Food in the Last Year:    Transportation Needs:     Lack of Transportation (Medical):      Lack of Transportation (Non-Medical):    Physical Activity:     Days of Exercise per Week:     Minutes of Exercise per Session:    Stress:     Feeling of Stress :    Social Connections:     Frequency of Communication with Friends and Family:     Frequency of Social Gatherings with Friends and Family:     Attends Christian Services:     Active Member of Clubs or Organizations:     Attends Club or Organization Meetings:     Marital Status:    Intimate Partner Violence:     Fear of Current or Ex-Partner:     Emotionally Abused:     Physically Abused:     Sexually Abused:        Current Outpatient Medications   Medication Sig Dispense Refill    terazosin (HYTRIN) 5 MG capsule TAKE 1 CAPSULE NIGHTLY 90 capsule 3    pantoprazole (PROTONIX) 40 MG tablet TAKE 1 TABLET DAILY 90 tablet 3    atorvastatin (LIPITOR) 10 MG tablet TAKE 1 TABLET DAILY 90 tablet 3    metoprolol succinate (TOPROL XL) 25 MG extended release tablet Take 1 tablet by mouth daily (Patient taking differently: Take 25 mg by mouth daily 1/2) 90 tablet 3    aspirin 81 MG EC tablet Take 81 mg by mouth daily.  Multiple Vitamins-Minerals (MULTIVITAMIN PO) Take  by mouth daily. No current facility-administered medications for this visit. No Known Allergies    Chief Complaint:  Shad Rose is here today for follow up and management/recomendations for bicuspid AV, AR, AS, HTN. History of Present Illness: Shad Rose states that He does house work, goes up the stairs, does yard work & goes shopping. He also walks 2-2.5 miles or exercises on a treadmill. [place] He denies any chest discomfort, dyspnea on exertion, orthopnea/PND, or lower extremity edema. He denies any palpitations or presyncopal symptoms. REVIEW OF SYSTEMS:  As above. Patient does not complain of any fever, chills, nausea, vomiting or diarrhea. No focal, motor or neurological deficits. No changes in his/her vision, hearing, bowel or bladder habits. He is not known to have a history of thyroid problems. No recent nose bleeds. PHYSICAL EXAM:  Vitals:    07/19/21 0855   BP: 118/88   Pulse: (!) 48   Resp: 18   Weight: 194 lb (88 kg)   Height: 5' 11\" (1.803 m)       GENERAL:  He is alert and oriented x 3, communicates well, in no distress. NECK:  No masses, trachea is mid position. Supple, full ROM, no JVD or bruits. No palpable thyromegaly or lymphadenopathy. HEART:  Regular rate and rhythm. Normal S1 and well-preserved S2.  There is an S4 gallop and a II/VI mid peaking systolic ejection

## 2021-07-19 NOTE — TELEPHONE ENCOUNTER
Per Dr. Santos Sadler, patient needs CTA chest re: TAA. Patient prefers [facility] on a Friday afternoon or a Saturday, not 8/7/21-8/19/21. MDCR - no auth required. Spoke with Timur in [employer]. CTA chest scheduled for 7/31/21 at 10:00 a.m. [email or web address] Patient to be notified to arrive at 9:30 a.m.; NPO (4) hours prior; bring list of meds/photo ID/ins cards. BMP to be done prior to day of CTA. Patient notified of appt date/time/location/instructions. [facility]

## 2021-07-24 ENCOUNTER — HOSPITAL ENCOUNTER (OUTPATIENT)
Age: 66
Discharge: HOME | End: 2021-07-24
Payer: MEDICARE

## 2021-07-24 DIAGNOSIS — I10 ESSENTIAL HYPERTENSION: ICD-10-CM

## 2021-07-24 LAB
ANION GAP SERPL CALCULATED.3IONS-SCNC: 8 MMOL/L (ref 7–16)
BUN BLDV-MCNC: 13 MG/DL (ref 6–23)
CALCIUM SERPL-MCNC: 9.2 MG/DL (ref 8.6–10.2)
CHLORIDE BLD-SCNC: 102 MMOL/L (ref 98–107)
CO2: 27 MMOL/L (ref 22–29)
CREAT SERPL-MCNC: 1 MG/DL (ref 0.7–1.2)
GFR AFRICAN AMERICAN: >60
GFR NON-AFRICAN AMERICAN: >60 ML/MIN/1.73
GLUCOSE BLD-MCNC: 102 MG/DL (ref 74–99)
POTASSIUM SERPL-SCNC: 4.4 MMOL/L (ref 3.5–5)
SODIUM BLD-SCNC: 137 MMOL/L (ref 132–146)

## 2021-07-24 PROCEDURE — 80048 BASIC METABOLIC PNL TOTAL CA: CPT

## 2021-07-24 PROCEDURE — 36415 COLL VENOUS BLD VENIPUNCTURE: CPT

## 2021-07-31 ENCOUNTER — HOSPITAL ENCOUNTER (OUTPATIENT)
Dept: CT IMAGING | Age: 66
Discharge: HOME | End: 2021-08-02
Payer: MEDICARE

## 2021-07-31 DIAGNOSIS — I71.20 THORACIC AORTIC ANEURYSM WITHOUT RUPTURE: ICD-10-CM

## 2021-07-31 PROCEDURE — 6360000004 HC RX CONTRAST MEDICATION: Performed by: RADIOLOGY

## 2021-07-31 PROCEDURE — 71275 CT ANGIOGRAPHY CHEST: CPT

## 2021-07-31 RX ADMIN — IOPAMIDOL 80 ML: 755 INJECTION, SOLUTION INTRAVENOUS at 09:43

## 2021-08-02 ENCOUNTER — TELEPHONE (OUTPATIENT)
Dept: CARDIOLOGY CLINIC | Age: 66
End: 2021-08-02

## 2021-08-02 NOTE — TELEPHONE ENCOUNTER
----- Message from Mansoor Grewal, DO sent at 7/31/2021  5:50 PM EDT -----  Let him know that the aorta is at the top limits of normal size. Not much change from previous. No cardiac changes.

## 2021-10-25 RX ORDER — PANTOPRAZOLE SODIUM 40 MG/1
TABLET, DELAYED RELEASE ORAL
Qty: 90 TABLET | Refills: 3 | Status: SHIPPED | OUTPATIENT
Start: 2021-10-25

## 2021-10-25 RX ORDER — ATORVASTATIN CALCIUM 10 MG/1
TABLET, FILM COATED ORAL
Qty: 90 TABLET | Refills: 3 | Status: SHIPPED | OUTPATIENT
Start: 2021-10-25

## 2021-10-25 RX ORDER — TERAZOSIN 5 MG/1
CAPSULE ORAL
Qty: 90 CAPSULE | Refills: 3 | Status: SHIPPED | OUTPATIENT
Start: 2021-10-25

## 2021-10-25 NOTE — TELEPHONE ENCOUNTER
----- Message from Baron Thornton sent at 10/23/2021  1:55 PM EDT -----  Subject: Message to Provider    QUESTIONS  Information for Provider? pt needs refills on lipator, pantoprazole 40 mg   and teravosin 5 mg [facility] could not get [facility] tp   come up  ---------------------------------------------------------------------------  --------------  [address]  What is the best way for the office to contact you? OK to leave message on   voicemail  Preferred Call Back Phone Number? [phone]  ---------------------------------------------------------------------------  --------------  SCRIPT ANSWERS  Relationship to Patient?  Self

## 2021-12-13 ENCOUNTER — TELEPHONE (OUTPATIENT)
Dept: PRIMARY CARE CLINIC | Age: 66
End: 2021-12-13

## 2021-12-13 DIAGNOSIS — E78.5 HYPERLIPIDEMIA, UNSPECIFIED HYPERLIPIDEMIA TYPE: ICD-10-CM

## 2021-12-13 DIAGNOSIS — I35.0 NONRHEUMATIC AORTIC VALVE STENOSIS: Primary | ICD-10-CM

## 2021-12-13 DIAGNOSIS — I10 ESSENTIAL HYPERTENSION: ICD-10-CM

## 2021-12-13 DIAGNOSIS — Z00.00 ANNUAL PHYSICAL EXAM: ICD-10-CM

## 2021-12-13 NOTE — TELEPHONE ENCOUNTER
Patient scheduled for physical next Monday 12/20. Patient asking for lab orders to be put in including a PSA. Will be using [facility]) lab.

## 2021-12-17 DIAGNOSIS — I10 ESSENTIAL HYPERTENSION: ICD-10-CM

## 2021-12-17 DIAGNOSIS — Z00.00 ANNUAL PHYSICAL EXAM: ICD-10-CM

## 2021-12-17 DIAGNOSIS — E78.5 HYPERLIPIDEMIA, UNSPECIFIED HYPERLIPIDEMIA TYPE: ICD-10-CM

## 2021-12-17 DIAGNOSIS — I35.0 NONRHEUMATIC AORTIC VALVE STENOSIS: ICD-10-CM

## 2021-12-17 LAB
ALBUMIN SERPL-MCNC: 4 G/DL (ref 3.5–5.2)
ALP BLD-CCNC: 92 U/L (ref 40–129)
ALT SERPL-CCNC: 15 U/L (ref 0–40)
ANION GAP SERPL CALCULATED.3IONS-SCNC: 12 MMOL/L (ref 7–16)
AST SERPL-CCNC: 22 U/L (ref 0–39)
BASOPHILS ABSOLUTE: 0.06 E9/L (ref 0–0.2)
BASOPHILS RELATIVE PERCENT: 0.8 % (ref 0–2)
BILIRUB SERPL-MCNC: 0.9 MG/DL (ref 0–1.2)
BUN BLDV-MCNC: 14 MG/DL (ref 6–23)
CALCIUM SERPL-MCNC: 9.6 MG/DL (ref 8.6–10.2)
CHLORIDE BLD-SCNC: 103 MMOL/L (ref 98–107)
CHOLESTEROL, TOTAL: 178 MG/DL (ref 0–199)
CO2: 25 MMOL/L (ref 22–29)
CREAT SERPL-MCNC: 1.2 MG/DL (ref 0.7–1.2)
EOSINOPHILS ABSOLUTE: 0.11 E9/L (ref 0.05–0.5)
EOSINOPHILS RELATIVE PERCENT: 1.5 % (ref 0–6)
GFR AFRICAN AMERICAN: >60
GFR NON-AFRICAN AMERICAN: >60 ML/MIN/1.73
GLUCOSE BLD-MCNC: 114 MG/DL (ref 74–99)
HCT VFR BLD CALC: 44 % (ref 37–54)
HDLC SERPL-MCNC: 59 MG/DL
HEMOGLOBIN: 14.2 G/DL (ref 12.5–16.5)
IMMATURE GRANULOCYTES #: 0.02 E9/L
IMMATURE GRANULOCYTES %: 0.3 % (ref 0–5)
LDL CHOLESTEROL CALCULATED: 103 MG/DL (ref 0–99)
LYMPHOCYTES ABSOLUTE: 1.56 E9/L (ref 1.5–4)
LYMPHOCYTES RELATIVE PERCENT: 21.3 % (ref 20–42)
MCH RBC QN AUTO: 31.8 PG (ref 26–35)
MCHC RBC AUTO-ENTMCNC: 32.3 % (ref 32–34.5)
MCV RBC AUTO: 98.4 FL (ref 80–99.9)
MONOCYTES ABSOLUTE: 0.71 E9/L (ref 0.1–0.95)
MONOCYTES RELATIVE PERCENT: 9.7 % (ref 2–12)
NEUTROPHILS ABSOLUTE: 4.86 E9/L (ref 1.8–7.3)
NEUTROPHILS RELATIVE PERCENT: 66.4 % (ref 43–80)
PDW BLD-RTO: 12.5 FL (ref 11.5–15)
PLATELET # BLD: 210 E9/L (ref 130–450)
PMV BLD AUTO: 10.8 FL (ref 7–12)
POTASSIUM SERPL-SCNC: 4.8 MMOL/L (ref 3.5–5)
PROSTATE SPECIFIC ANTIGEN: 0.42 NG/ML (ref 0–4)
RBC # BLD: 4.47 E12/L (ref 3.8–5.8)
SODIUM BLD-SCNC: 140 MMOL/L (ref 132–146)
T4 TOTAL: 7.1 MCG/DL (ref 4.5–11.7)
TOTAL PROTEIN: 6.5 G/DL (ref 6.4–8.3)
TRIGL SERPL-MCNC: 80 MG/DL (ref 0–149)
TSH SERPL DL<=0.05 MIU/L-ACNC: 1.97 UIU/ML (ref 0.27–4.2)
VLDLC SERPL CALC-MCNC: 16 MG/DL
WBC # BLD: 7.3 E9/L (ref 4.5–11.5)

## 2021-12-20 ENCOUNTER — OFFICE VISIT (OUTPATIENT)
Dept: PRIMARY CARE CLINIC | Age: 66
End: 2021-12-20
Payer: MEDICARE

## 2021-12-20 VITALS
SYSTOLIC BLOOD PRESSURE: 102 MMHG | TEMPERATURE: 98.7 F | HEART RATE: 52 BPM | DIASTOLIC BLOOD PRESSURE: 74 MMHG | BODY MASS INDEX: 27.34 KG/M2 | WEIGHT: 196 LBS | OXYGEN SATURATION: 98 %

## 2021-12-20 DIAGNOSIS — I10 ESSENTIAL HYPERTENSION: ICD-10-CM

## 2021-12-20 DIAGNOSIS — Z00.00 ANNUAL PHYSICAL EXAM: Primary | ICD-10-CM

## 2021-12-20 DIAGNOSIS — Q23.1 BICUSPID AORTIC VALVE: ICD-10-CM

## 2021-12-20 DIAGNOSIS — E78.5 HYPERLIPIDEMIA, UNSPECIFIED HYPERLIPIDEMIA TYPE: ICD-10-CM

## 2021-12-20 DIAGNOSIS — I35.0 NONRHEUMATIC AORTIC VALVE STENOSIS: ICD-10-CM

## 2021-12-20 LAB
BILIRUBIN, POC: NORMAL
BLOOD URINE, POC: NORMAL
CLARITY, POC: CLEAR
COLOR, POC: YELLOW
GLUCOSE URINE, POC: NORMAL
KETONES, POC: NORMAL
LEUKOCYTE EST, POC: NORMAL
NITRITE, POC: NORMAL
PH, POC: 5.5
PROTEIN, POC: NORMAL
SPECIFIC GRAVITY, POC: >1.03
UROBILINOGEN, POC: 0.2

## 2021-12-20 PROCEDURE — G0439 PPPS, SUBSEQ VISIT: HCPCS | Performed by: FAMILY MEDICINE

## 2021-12-20 PROCEDURE — 93010 ELECTROCARDIOGRAM REPORT: CPT | Performed by: FAMILY MEDICINE

## 2021-12-20 PROCEDURE — 81002 URINALYSIS NONAUTO W/O SCOPE: CPT | Performed by: FAMILY MEDICINE

## 2021-12-20 RX ORDER — METOPROLOL SUCCINATE 25 MG/1
TABLET, EXTENDED RELEASE ORAL
Qty: 45 TABLET | Refills: 3 | Status: SHIPPED | OUTPATIENT
Start: 2021-12-20

## 2021-12-20 ASSESSMENT — ENCOUNTER SYMPTOMS
RESPIRATORY NEGATIVE: 1
GASTROINTESTINAL NEGATIVE: 1
ALLERGIC/IMMUNOLOGIC NEGATIVE: 1
EYES NEGATIVE: 1

## 2021-12-20 ASSESSMENT — PATIENT HEALTH QUESTIONNAIRE - PHQ9
SUM OF ALL RESPONSES TO PHQ QUESTIONS 1-9: 0
SUM OF ALL RESPONSES TO PHQ QUESTIONS 1-9: 0
1. LITTLE INTEREST OR PLEASURE IN DOING THINGS: 0
2. FEELING DOWN, DEPRESSED OR HOPELESS: 0
SUM OF ALL RESPONSES TO PHQ9 QUESTIONS 1 & 2: 0
SUM OF ALL RESPONSES TO PHQ QUESTIONS 1-9: 0

## 2021-12-20 NOTE — PROGRESS NOTES
21     Melissa [facility]    : 1955 Sex: male   Age: 78 y.o. Chief Complaint   Patient presents with    Annual Exam     saw cardio in july    Discuss Labs       Prior to Admission medications    Medication Sig Start Date End Date Taking? Authorizing Provider   metoprolol succinate (TOPROL XL) 25 MG extended release tablet 1/2 pill daily 21  Yes Juan Frank Traikoff, DO   atorvastatin (LIPITOR) 10 MG tablet TAKE 1 TABLET DAILY 10/25/21  Yes Juan Frank Traikoff, DO   pantoprazole (PROTONIX) 40 MG tablet TAKE 1 TABLET DAILY 10/25/21  Yes Juan Frank Traikoff, DO   terazosin (HYTRIN) 5 MG capsule TAKE 1 CAPSULE NIGHTLY 10/25/21  Yes Juan Frank Traikoff, DO   aspirin 81 MG EC tablet Take 81 mg by mouth daily. Yes Historical Provider, MD   Multiple Vitamins-Minerals (MULTIVITAMIN PO) Take  by mouth daily. Yes Historical Provider, MD          HPI: Evaluated today health maintenance physical aortic valve stenosis bicuspid aortic valve hypertension hyperlipidemia mild sugar elevation. Overall medically very stable doing well with current medications systems review stable. Review of Systems   Constitutional: Negative. HENT: Negative. Eyes: Negative. Respiratory: Negative. Gastrointestinal: Negative. Endocrine: Negative. Genitourinary: Negative. Musculoskeletal: Negative. Skin: Negative. Allergic/Immunologic: Negative. Neurological: Negative. Hematological: Negative. Psychiatric/Behavioral: Negative. Current Outpatient Medications:     metoprolol succinate (TOPROL XL) 25 MG extended release tablet, 1/2 pill daily, Disp: 45 tablet, Rfl: 3    atorvastatin (LIPITOR) 10 MG tablet, TAKE 1 TABLET DAILY, Disp: 90 tablet, Rfl: 3    pantoprazole (PROTONIX) 40 MG tablet, TAKE 1 TABLET DAILY, Disp: 90 tablet, Rfl: 3    terazosin (HYTRIN) 5 MG capsule, TAKE 1 CAPSULE NIGHTLY, Disp: 90 capsule, Rfl: 3    aspirin 81 MG EC tablet, Take 81 mg by mouth daily. , Disp: Results   Component Value Date    TSH 1.970 12/17/2021    TSH 2.470 10/23/2020    [id number] 7.1 12/17/2021    [id number] 7.6 10/23/2020     Lab Results   Component Value Date    CHOL 178 12/17/2021    CHOL 181 10/23/2020     Lab Results   Component Value Date    TRIG 80 12/17/2021    TRIG 91 10/23/2020     Lab Results   Component Value Date    HDL 59 12/17/2021    HDL 51 10/23/2020     No results found for: [facility]  Lab Results   Component Value Date    LABVLDL 16 12/17/2021    LABVLDL 18 10/23/2020     No results found for: [facility]  Lab Results   Component Value Date    WBC 7.3 12/17/2021    HGB 14.2 12/17/2021    HCT 44.0 12/17/2021    MCV 98.4 12/17/2021     12/17/2021    LYMPHOPCT 21.3 12/17/2021    RBC 4.47 12/17/2021    MCH 31.8 12/17/2021    MCHC 32.3 12/17/2021    RDW 12.5 12/17/2021     Lab Results   Component Value Date     12/17/2021    K 4.8 12/17/2021     12/17/2021    CO2 25 12/17/2021    BUN 14 12/17/2021    CREATININE 1.2 12/17/2021    GLUCOSE 114 (H) 12/17/2021    CALCIUM 9.6 12/17/2021    PROT 6.5 12/17/2021    LABALBU 4.0 12/17/2021    BILITOT 0.9 12/17/2021    ALKPHOS 92 12/17/2021    AST 22 12/17/2021    ALT 15 12/17/2021    LABGLOM >60 12/17/2021    GFRAA >60 12/17/2021        Lab Results   Component Value Date    PSA 0.42 12/17/2021    PSA 0.50 10/23/2020    PSA 0.44 10/14/2019      No results found for: LABA1C  No results found for: EAG     Plan Per Assessment:  Maria Esther Fish was seen today for annual exam and discuss labs. Diagnoses and all orders for this visit:    Annual physical exam  -     POCT Urinalysis no Micro    Other orders  -     metoprolol succinate (TOPROL XL) 25 MG extended release tablet; 1/2 pill daily            No follow-ups on file. Floyd Sun DO    Note was generated with the assistance of voice recognition software. Document was reviewed however may contain grammatical errors.

## 2021-12-20 NOTE — PROGRESS NOTES
21     [facility]    : 1955 Sex: male   Age: 78 y.o. Chief Complaint   Patient presents with    Annual Exam     saw cardio in july    Discuss Labs       Prior to Admission medications    Medication Sig Start Date End Date Taking? Authorizing Provider   metoprolol succinate (TOPROL XL) 25 MG extended release tablet 1/2 pill daily 21  Yes Mike Backoff, DO   atorvastatin (LIPITOR) 10 MG tablet TAKE 1 TABLET DAILY 10/25/21  Yes Mike Alexanderikoff, DO   pantoprazole (PROTONIX) 40 MG tablet TAKE 1 TABLET DAILY 10/25/21  Yes Mike Alexanderikoff, DO   terazosin (HYTRIN) 5 MG capsule TAKE 1 CAPSULE NIGHTLY 10/25/21  Yes Mike Wilson, DO   aspirin 81 MG EC tablet Take 81 mg by mouth daily. Yes Historical Provider, MD   Multiple Vitamins-Minerals (MULTIVITAMIN PO) Take  by mouth daily. Yes Historical Provider, MD          HPI:           Review of Systems           Current Outpatient Medications:     metoprolol succinate (TOPROL XL) 25 MG extended release tablet, 1/2 pill daily, Disp: 45 tablet, Rfl: 3    atorvastatin (LIPITOR) 10 MG tablet, TAKE 1 TABLET DAILY, Disp: 90 tablet, Rfl: 3    pantoprazole (PROTONIX) 40 MG tablet, TAKE 1 TABLET DAILY, Disp: 90 tablet, Rfl: 3    terazosin (HYTRIN) 5 MG capsule, TAKE 1 CAPSULE NIGHTLY, Disp: 90 capsule, Rfl: 3    aspirin 81 MG EC tablet, Take 81 mg by mouth daily. , Disp: , Rfl:     Multiple Vitamins-Minerals (MULTIVITAMIN PO), Take  by mouth daily. , Disp: , Rfl:     No Known Allergies    Social History     Tobacco Use    Smoking status: Never Smoker    Smokeless tobacco: Never Used   Substance Use Topics    Alcohol use: Yes     Comment: Two drinks twice weekly    Drug use: No      No past surgical history on file. No family history on file.   Past Medical History:   Diagnosis Date    Aortic regurgitation     mild to moderate    Aortic stenosis     mild    Basal cell carcinoma     Bicuspid aortic valve     Hypertension Vitals:    12/20/21 1501   BP: 102/74   Pulse: 52   Temp: 98.7 °F (37.1 °C)   SpO2: 98%   Weight: 196 lb (88.9 kg)     BP Readings from Last 3 Encounters:   12/20/21 102/74   07/19/21 118/88   11/02/20 98/60        Physical Exam             Plan Per Assessment:  Tiffanie Xavier was seen today for annual exam and discuss labs. Diagnoses and all orders for this visit:    Annual physical exam  -     POCT Urinalysis no Micro    Nonrheumatic aortic valve stenosis    Bicuspid aortic valve    Essential hypertension    Hyperlipidemia, unspecified hyperlipidemia type    Other orders  -     metoprolol succinate (TOPROL XL) 25 MG extended release tablet; 1/2 pill daily            No follow-ups on file. Kayli Reno DO    Note was generated with the assistance of voice recognition software. Document was reviewed however may contain grammatical errors.

## 2022-01-02 DIAGNOSIS — J01.90 ACUTE NON-RECURRENT SINUSITIS, UNSPECIFIED LOCATION: Primary | ICD-10-CM

## 2022-01-02 RX ORDER — AZITHROMYCIN 250 MG/1
TABLET, FILM COATED ORAL
Qty: 1 PACKET | Refills: 0 | Status: SHIPPED
Start: 2022-01-02 | End: 2022-03-05 | Stop reason: SDUPTHER

## 2022-01-19 PROBLEM — Z00.00 ANNUAL PHYSICAL EXAM: Status: RESOLVED | Noted: 2020-11-02 | Resolved: 2022-01-19

## 2022-03-05 DIAGNOSIS — J01.90 ACUTE NON-RECURRENT SINUSITIS, UNSPECIFIED LOCATION: ICD-10-CM

## 2022-03-05 RX ORDER — AZITHROMYCIN 250 MG/1
TABLET, FILM COATED ORAL
Qty: 1 PACKET | Refills: 0 | Status: SHIPPED
Start: 2022-03-05 | End: 2022-08-19

## 2022-08-17 DIAGNOSIS — Q23.1 BICUSPID AORTIC VALVE: ICD-10-CM

## 2022-08-17 DIAGNOSIS — I35.0 AORTIC VALVE STENOSIS, ETIOLOGY OF CARDIAC VALVE DISEASE UNSPECIFIED: ICD-10-CM

## 2022-08-17 DIAGNOSIS — I35.1 AORTIC VALVE INSUFFICIENCY, ETIOLOGY OF CARDIAC VALVE DISEASE UNSPECIFIED: Primary | ICD-10-CM

## 2022-08-19 ENCOUNTER — TELEPHONE (OUTPATIENT)
Dept: PRIMARY CARE CLINIC | Age: 67
End: 2022-08-19

## 2022-08-19 ENCOUNTER — OFFICE VISIT (OUTPATIENT)
Dept: FAMILY MEDICINE CLINIC | Age: 67
End: 2022-08-19
Payer: MEDICARE

## 2022-08-19 VITALS
DIASTOLIC BLOOD PRESSURE: 64 MMHG | OXYGEN SATURATION: 97 % | TEMPERATURE: 99 F | HEART RATE: 60 BPM | BODY MASS INDEX: 25.61 KG/M2 | SYSTOLIC BLOOD PRESSURE: 122 MMHG | WEIGHT: 183.6 LBS

## 2022-08-19 DIAGNOSIS — R00.2 PALPITATIONS: Primary | ICD-10-CM

## 2022-08-19 DIAGNOSIS — I10 ESSENTIAL HYPERTENSION: ICD-10-CM

## 2022-08-19 DIAGNOSIS — R00.2 PALPITATIONS: ICD-10-CM

## 2022-08-19 DIAGNOSIS — E78.5 HYPERLIPIDEMIA, UNSPECIFIED HYPERLIPIDEMIA TYPE: ICD-10-CM

## 2022-08-19 LAB
ALBUMIN SERPL-MCNC: 4.4 G/DL (ref 3.5–5.2)
ALP BLD-CCNC: 83 U/L (ref 40–129)
ALT SERPL-CCNC: 12 U/L (ref 0–40)
ANION GAP SERPL CALCULATED.3IONS-SCNC: 12 MMOL/L (ref 7–16)
AST SERPL-CCNC: 23 U/L (ref 0–39)
BASOPHILS ABSOLUTE: 0.07 E9/L (ref 0–0.2)
BASOPHILS RELATIVE PERCENT: 1 % (ref 0–2)
BILIRUB SERPL-MCNC: 0.7 MG/DL (ref 0–1.2)
BUN BLDV-MCNC: 12 MG/DL (ref 6–23)
CALCIUM SERPL-MCNC: 9.3 MG/DL (ref 8.6–10.2)
CHLORIDE BLD-SCNC: 104 MMOL/L (ref 98–107)
CHOLESTEROL, TOTAL: 176 MG/DL (ref 0–199)
CO2: 24 MMOL/L (ref 22–29)
CREAT SERPL-MCNC: 1 MG/DL (ref 0.7–1.2)
EOSINOPHILS ABSOLUTE: 0.09 E9/L (ref 0.05–0.5)
EOSINOPHILS RELATIVE PERCENT: 1.3 % (ref 0–6)
GFR AFRICAN AMERICAN: >60
GFR NON-AFRICAN AMERICAN: >60 ML/MIN/1.73
GLUCOSE BLD-MCNC: 103 MG/DL (ref 74–99)
HBA1C MFR BLD: 5.4 % (ref 4–5.6)
HCT VFR BLD CALC: 43.2 % (ref 37–54)
HDLC SERPL-MCNC: 53 MG/DL
HEMOGLOBIN: 14.1 G/DL (ref 12.5–16.5)
IMMATURE GRANULOCYTES #: 0.02 E9/L
IMMATURE GRANULOCYTES %: 0.3 % (ref 0–5)
LDL CHOLESTEROL CALCULATED: 108 MG/DL (ref 0–99)
LYMPHOCYTES ABSOLUTE: 1.31 E9/L (ref 1.5–4)
LYMPHOCYTES RELATIVE PERCENT: 18.9 % (ref 20–42)
MAGNESIUM: 1.9 MG/DL (ref 1.6–2.6)
MCH RBC QN AUTO: 32.3 PG (ref 26–35)
MCHC RBC AUTO-ENTMCNC: 32.6 % (ref 32–34.5)
MCV RBC AUTO: 99.1 FL (ref 80–99.9)
MONOCYTES ABSOLUTE: 0.66 E9/L (ref 0.1–0.95)
MONOCYTES RELATIVE PERCENT: 9.5 % (ref 2–12)
NEUTROPHILS ABSOLUTE: 4.78 E9/L (ref 1.8–7.3)
NEUTROPHILS RELATIVE PERCENT: 69 % (ref 43–80)
PDW BLD-RTO: 12.3 FL (ref 11.5–15)
PLATELET # BLD: 185 E9/L (ref 130–450)
PMV BLD AUTO: 11.6 FL (ref 7–12)
POTASSIUM SERPL-SCNC: 4.6 MMOL/L (ref 3.5–5)
RBC # BLD: 4.36 E12/L (ref 3.8–5.8)
SODIUM BLD-SCNC: 140 MMOL/L (ref 132–146)
TOTAL PROTEIN: 6.5 G/DL (ref 6.4–8.3)
TRIGL SERPL-MCNC: 74 MG/DL (ref 0–149)
TSH SERPL DL<=0.05 MIU/L-ACNC: 1.56 UIU/ML (ref 0.27–4.2)
VLDLC SERPL CALC-MCNC: 15 MG/DL
WBC # BLD: 6.9 E9/L (ref 4.5–11.5)

## 2022-08-19 PROCEDURE — G8427 DOCREV CUR MEDS BY ELIG CLIN: HCPCS | Performed by: PHYSICIAN ASSISTANT

## 2022-08-19 PROCEDURE — 1036F TOBACCO NON-USER: CPT | Performed by: PHYSICIAN ASSISTANT

## 2022-08-19 PROCEDURE — G8417 CALC BMI ABV UP PARAM F/U: HCPCS | Performed by: PHYSICIAN ASSISTANT

## 2022-08-19 PROCEDURE — 1123F ACP DISCUSS/DSCN MKR DOCD: CPT | Performed by: PHYSICIAN ASSISTANT

## 2022-08-19 PROCEDURE — 99214 OFFICE O/P EST MOD 30 MIN: CPT | Performed by: PHYSICIAN ASSISTANT

## 2022-08-19 PROCEDURE — 3017F COLORECTAL CA SCREEN DOC REV: CPT | Performed by: PHYSICIAN ASSISTANT

## 2022-08-19 PROCEDURE — 93000 ELECTROCARDIOGRAM COMPLETE: CPT | Performed by: PHYSICIAN ASSISTANT

## 2022-08-19 NOTE — PROGRESS NOTES
22  Selin Cedeño : 1955 Sex: male  Age 75 y.o. Subjective:  Chief Complaint   Patient presents with    Palpitations     X1 week,          HPI:   Selin Cedeño , 75 y.o. male presents to express care for evaluation of palpitations    HPI  42-year-old male presents to express care for evaluation of palpitations. The patient has had these palpitations ongoing for about a week. The patient has a cardiologist appointment in October. The patient does have a history of aortic stenosis. The patient had the symptoms not too long ago and was started on metoprolol XL 25 mg once daily. The then was reduced to half a tablet a day. The patient has been compliant with the medication. The patient really has not had any increased caffeine or any other new medications to cause these palpitation. He is not having any other symptoms. Is not any fever, chills. No chest pain, shortness of breath. No lightheadedness, dizziness. The patient just does aware of the palpitations. ROS:   Unless otherwise stated in this report the patient's positive and negative responses for review of systems for constitutional, eyes, ENT, cardiovascular, respiratory, gastrointestinal, neurological, , musculoskeletal, and integument systems and related systems to the presenting problem are either stated in the history of present illness or were not pertinent or were negative for the symptoms and/or complaints related to the presenting medical problem. Positives and pertinent negatives as per HPI. All others reviewed and are negative. PMH:     Past Medical History:   Diagnosis Date    Aortic regurgitation     mild to moderate    Aortic stenosis     mild    Basal cell carcinoma     Bicuspid aortic valve     Hypertension        History reviewed. No pertinent surgical history. History reviewed. No pertinent family history.     Medications:     Current Outpatient Medications:     metoprolol succinate (TOPROL XL) 25 MG extended release tablet, 1/2 pill daily, Disp: 45 tablet, Rfl: 3    atorvastatin (LIPITOR) 10 MG tablet, TAKE 1 TABLET DAILY, Disp: 90 tablet, Rfl: 3    pantoprazole (PROTONIX) 40 MG tablet, TAKE 1 TABLET DAILY, Disp: 90 tablet, Rfl: 3    terazosin (HYTRIN) 5 MG capsule, TAKE 1 CAPSULE NIGHTLY, Disp: 90 capsule, Rfl: 3    aspirin 81 MG EC tablet, Take 81 mg by mouth daily. , Disp: , Rfl:     Multiple Vitamins-Minerals (MULTIVITAMIN PO), Take  by mouth daily. , Disp: , Rfl:     Allergies:   No Known Allergies    Social History:     Social History     Tobacco Use    Smoking status: Never    Smokeless tobacco: Never   Substance Use Topics    Alcohol use: Yes     Comment: Two drinks twice weekly    Drug use: No       Patient lives at home. Physical Exam:     Vitals:    08/19/22 1333   BP: 122/64   Site: Right Upper Arm   Position: Sitting   Pulse: 60   Temp: 99 °F (37.2 °C)   TempSrc: Temporal   SpO2: 97%   Weight: 183 lb 9.6 oz (83.3 kg)       Exam:  Physical Exam  Nurses note and vital signs reviewed and patient is not hypoxic. General: The patient appears well and in no apparent distress. Patient is resting comfortably on cart. Skin: Warm, dry, no pallor noted. There is no rash noted. Head: Normocephalic, atraumatic. Eye: Normal conjunctiva  Ears, Nose, Mouth, and Throat: Oral mucosa is moist  Cardiovascular: Regular Rate and Rhythm  Respiratory: Patient is in no distress, no accessory muscle use, lungs are clear to auscultation, no wheezing, crackles or rhonchi  Back: Non-tender, no CVA tenderness bilaterally to percussion. GI: Normal bowel sounds, no tenderness to palpation, no masses appreciated. No rebound, guarding, or rigidity noted. Neurological: A&O x4, normal speech        Testing:           Medical Decision Making:     Vital signs reviewed    Past medical history reviewed. Allergies reviewed. Medications reviewed.     Patient on arrival does not appear to be in any apparent distress or

## 2022-08-20 NOTE — RESULT ENCOUNTER NOTE
Laboratory studies are essentially unremarkable.   Please have the patient keep appointment on Monday with PCP

## 2022-08-22 ENCOUNTER — OFFICE VISIT (OUTPATIENT)
Dept: PRIMARY CARE CLINIC | Age: 67
End: 2022-08-22
Payer: MEDICARE

## 2022-08-22 VITALS
SYSTOLIC BLOOD PRESSURE: 110 MMHG | DIASTOLIC BLOOD PRESSURE: 76 MMHG | HEART RATE: 48 BPM | OXYGEN SATURATION: 97 % | TEMPERATURE: 98.3 F

## 2022-08-22 DIAGNOSIS — I10 ESSENTIAL HYPERTENSION: Primary | ICD-10-CM

## 2022-08-22 DIAGNOSIS — R00.2 PALPITATIONS: ICD-10-CM

## 2022-08-22 DIAGNOSIS — K21.00 GASTROESOPHAGEAL REFLUX DISEASE WITH ESOPHAGITIS WITHOUT HEMORRHAGE: ICD-10-CM

## 2022-08-22 DIAGNOSIS — E78.5 HYPERLIPIDEMIA, UNSPECIFIED HYPERLIPIDEMIA TYPE: ICD-10-CM

## 2022-08-22 PROCEDURE — 3017F COLORECTAL CA SCREEN DOC REV: CPT | Performed by: FAMILY MEDICINE

## 2022-08-22 PROCEDURE — 1123F ACP DISCUSS/DSCN MKR DOCD: CPT | Performed by: FAMILY MEDICINE

## 2022-08-22 PROCEDURE — G8427 DOCREV CUR MEDS BY ELIG CLIN: HCPCS | Performed by: FAMILY MEDICINE

## 2022-08-22 PROCEDURE — 99214 OFFICE O/P EST MOD 30 MIN: CPT | Performed by: FAMILY MEDICINE

## 2022-08-22 PROCEDURE — 1036F TOBACCO NON-USER: CPT | Performed by: FAMILY MEDICINE

## 2022-08-22 PROCEDURE — G8417 CALC BMI ABV UP PARAM F/U: HCPCS | Performed by: FAMILY MEDICINE

## 2022-08-22 ASSESSMENT — PATIENT HEALTH QUESTIONNAIRE - PHQ9
SUM OF ALL RESPONSES TO PHQ9 QUESTIONS 1 & 2: 0
1. LITTLE INTEREST OR PLEASURE IN DOING THINGS: 0
SUM OF ALL RESPONSES TO PHQ QUESTIONS 1-9: 0
SUM OF ALL RESPONSES TO PHQ QUESTIONS 1-9: 0
2. FEELING DOWN, DEPRESSED OR HOPELESS: 0
SUM OF ALL RESPONSES TO PHQ QUESTIONS 1-9: 0
SUM OF ALL RESPONSES TO PHQ QUESTIONS 1-9: 0

## 2022-08-22 NOTE — PROGRESS NOTES
22     Chloe Dixon    : 1955 Sex: male   Age: 75 y.o. Chief Complaint   Patient presents with    Palpitations     Was in on express last week    Discuss Labs       Prior to Admission medications    Medication Sig Start Date End Date Taking? Authorizing Provider   metoprolol succinate (TOPROL XL) 25 MG extended release tablet 1/2 pill daily 21  Yes Virginia Wilson, DO   atorvastatin (LIPITOR) 10 MG tablet TAKE 1 TABLET DAILY 10/25/21  Yes Virginia Belling Traikoff, DO   pantoprazole (PROTONIX) 40 MG tablet TAKE 1 TABLET DAILY 10/25/21  Yes Virginia Anjel Traikoff, DO   terazosin (HYTRIN) 5 MG capsule TAKE 1 CAPSULE NIGHTLY 10/25/21  Yes Virginia Wilson, DO   aspirin 81 MG EC tablet Take 81 mg by mouth daily. Yes Historical Provider, MD   Multiple Vitamins-Minerals (MULTIVITAMIN PO) Take  by mouth daily. Yes Historical Provider, MD          HPI: Patient seen this morning hypertension hyperlipidemia reflux disease heart palpitations. Overall medically he is currently doing well. Palpitations improved with the adjustment of metoprolol to 25 daily and we will continue. Medically otherwise stable. Review of Systems   Constitutional: Negative. HENT: Negative. Eyes: Negative. Respiratory: Negative. Gastrointestinal: Negative. Endocrine: Negative. Genitourinary: Negative. Musculoskeletal: Negative. Skin: Negative. Allergic/Immunologic: Negative. Neurological: Negative. Hematological: Negative. Psychiatric/Behavioral: Negative. Today systems review stable maintain current meds care.         Current Outpatient Medications:     metoprolol succinate (TOPROL XL) 25 MG extended release tablet, 1/2 pill daily, Disp: 45 tablet, Rfl: 3    atorvastatin (LIPITOR) 10 MG tablet, TAKE 1 TABLET DAILY, Disp: 90 tablet, Rfl: 3    pantoprazole (PROTONIX) 40 MG tablet, TAKE 1 TABLET DAILY, Disp: 90 tablet, Rfl: 3    terazosin (HYTRIN) 5 MG capsule, TAKE 1 CAPSULE NIGHTLY, Disp: 90 capsule, Rfl: 3    aspirin 81 MG EC tablet, Take 81 mg by mouth daily. , Disp: , Rfl:     Multiple Vitamins-Minerals (MULTIVITAMIN PO), Take  by mouth daily. , Disp: , Rfl:     No Known Allergies    Social History     Tobacco Use    Smoking status: Never    Smokeless tobacco: Never   Substance Use Topics    Alcohol use: Yes     Comment: Two drinks twice weekly    Drug use: No      No past surgical history on file. No family history on file. Past Medical History:   Diagnosis Date    Aortic regurgitation     mild to moderate    Aortic stenosis     mild    Basal cell carcinoma     Bicuspid aortic valve     Hypertension        Vitals:    08/22/22 0737   BP: 110/76   Pulse: (!) 48   Temp: 98.3 °F (36.8 °C)   SpO2: 97%     BP Readings from Last 3 Encounters:   08/22/22 110/76   08/19/22 122/64   12/20/21 102/74        Physical Exam  Vitals and nursing note reviewed. Constitutional:       Appearance: He is well-developed. HENT:      Head: Normocephalic. Right Ear: External ear normal.      Left Ear: External ear normal.      Nose: Nose normal.   Eyes:      Conjunctiva/sclera: Conjunctivae normal.      Pupils: Pupils are equal, round, and reactive to light. Cardiovascular:      Rate and Rhythm: Normal rate. Pulmonary:      Breath sounds: Normal breath sounds. Abdominal:      General: Bowel sounds are normal.      Palpations: Abdomen is soft. Musculoskeletal:         General: Normal range of motion. Cervical back: Normal range of motion and neck supple. Skin:     General: Skin is warm and dry. Neurological:      Mental Status: He is alert and oriented to person, place, and time. Psychiatric:         Behavior: Behavior normal.       vitals physical examination stable. I will sit tight with present meds and care. Rogerioashlie Farina will see me back in November and sooner if problems. Plan Per Assessment:  Pretty Liao was seen today for palpitations and discuss labs.     Diagnoses and all orders for this visit:    Essential hypertension    Hyperlipidemia, unspecified hyperlipidemia type    Gastroesophageal reflux disease with esophagitis without hemorrhage    Palpitations          Return in about 3 months (around 11/22/2022). Rohan Rivera,     Note was generated with the assistance of voice recognition software. Document was reviewed however may contain grammatical errors.

## 2022-08-23 ENCOUNTER — TELEPHONE (OUTPATIENT)
Dept: ADMINISTRATIVE | Age: 67
End: 2022-08-23

## 2022-08-29 ENCOUNTER — HOSPITAL ENCOUNTER (OUTPATIENT)
Dept: SLEEP CENTER | Age: 67
Discharge: HOME | End: 2022-08-29
Payer: MEDICARE

## 2022-08-29 ENCOUNTER — OFFICE VISIT (OUTPATIENT)
Dept: CARDIOLOGY CLINIC | Age: 67
End: 2022-08-29
Payer: MEDICARE

## 2022-08-29 VITALS
SYSTOLIC BLOOD PRESSURE: 138 MMHG | WEIGHT: 186.2 LBS | HEIGHT: 71 IN | DIASTOLIC BLOOD PRESSURE: 76 MMHG | RESPIRATION RATE: 18 BRPM | BODY MASS INDEX: 26.07 KG/M2 | HEART RATE: 59 BPM

## 2022-08-29 DIAGNOSIS — I49.3 FREQUENT PVCS: ICD-10-CM

## 2022-08-29 DIAGNOSIS — I35.1 NONRHEUMATIC AORTIC VALVE INSUFFICIENCY: ICD-10-CM

## 2022-08-29 DIAGNOSIS — R00.2 PALPITATIONS: ICD-10-CM

## 2022-08-29 DIAGNOSIS — Q23.1 BICUSPID AORTIC VALVE: ICD-10-CM

## 2022-08-29 DIAGNOSIS — I35.0 NONRHEUMATIC AORTIC VALVE STENOSIS: ICD-10-CM

## 2022-08-29 DIAGNOSIS — Q23.1 BICUSPID AORTIC VALVE: Primary | ICD-10-CM

## 2022-08-29 PROCEDURE — 93225 XTRNL ECG REC<48 HRS REC: CPT

## 2022-08-29 PROCEDURE — 3017F COLORECTAL CA SCREEN DOC REV: CPT | Performed by: INTERNAL MEDICINE

## 2022-08-29 PROCEDURE — 1123F ACP DISCUSS/DSCN MKR DOCD: CPT | Performed by: INTERNAL MEDICINE

## 2022-08-29 PROCEDURE — G8427 DOCREV CUR MEDS BY ELIG CLIN: HCPCS | Performed by: INTERNAL MEDICINE

## 2022-08-29 PROCEDURE — 1036F TOBACCO NON-USER: CPT | Performed by: INTERNAL MEDICINE

## 2022-08-29 PROCEDURE — 93226 XTRNL ECG REC<48 HR SCAN A/R: CPT

## 2022-08-29 PROCEDURE — 93000 ELECTROCARDIOGRAM COMPLETE: CPT | Performed by: INTERNAL MEDICINE

## 2022-08-29 PROCEDURE — 99214 OFFICE O/P EST MOD 30 MIN: CPT | Performed by: INTERNAL MEDICINE

## 2022-08-29 PROCEDURE — G8417 CALC BMI ABV UP PARAM F/U: HCPCS | Performed by: INTERNAL MEDICINE

## 2022-08-29 NOTE — PROGRESS NOTES
Loki Cote  [birth year]  Date of Service: 8/29/2022    Patient Active Problem List    Diagnosis Date Noted    Palpitations 08/22/2022     Priority: Medium    Need for pneumococcal vaccination 10/14/2019    Gastroesophageal reflux disease with esophagitis 10/14/2019    Hyperlipidemia 10/14/2019    Essential hypertension 10/14/2019    Asymptomatic microscopic hematuria 10/14/2019    Thoracic aortic aneurysm without rupture ([facility] [address] 03/18/2019     Overview Note:     Ascending: 3.8 x 3.9 cm on CTA      Bicuspid aortic valve     Aortic stenosis      Overview Note:     Moderate       Aortic regurgitation      Overview Note:     mild to moderate         Social History     Socioeconomic History    Marital status:      Spouse name: None    Number of children: None    Years of education: None    Highest education level: None   Tobacco Use    Smoking status: Never    Smokeless tobacco: Never   Substance and Sexual Activity    Alcohol use: Yes     Comment: Two drinks twice weekly    Drug use: No       Current Outpatient Medications   Medication Sig Dispense Refill    metoprolol succinate (TOPROL XL) 25 MG extended release tablet 1/2 pill daily 45 tablet 3    atorvastatin (LIPITOR) 10 MG tablet TAKE 1 TABLET DAILY 90 tablet 3    pantoprazole (PROTONIX) 40 MG tablet TAKE 1 TABLET DAILY 90 tablet 3    terazosin (HYTRIN) 5 MG capsule TAKE 1 CAPSULE NIGHTLY 90 capsule 3    aspirin 81 MG EC tablet Take 81 mg by mouth daily. Multiple Vitamins-Minerals (MULTIVITAMIN PO) Take  by mouth daily. No current facility-administered medications for this visit. No Known Allergies    Chief Complaint:  Loki Cote is here today for follow up and management/recomendations for bicuspid AV, AR, AS, HTN. History of Present Illness: Loki Cote states that He does house work, goes up the stairs, does yard work & goes shopping. He also walks 3 miles. And hits balls at the driving range.  He denies any chest discomfort, dyspnea on exertion, orthopnea/PND, or lower extremity edema. He denies any presyncopal symptoms. He had palpitations years ago. He now states that the palpitations have returned over the past 3 weeks. They actually resolved after he exercises. His Toprol was increased to 25 mg 1 week ago. However, he states that this lowered his heart rate into the 40s and did not help his palpitations. Therefore this was decreased back to 12.5 mg once a day. REVIEW OF SYSTEMS:  As above. Patient does not complain of any fever, chills, nausea, vomiting or diarrhea. No focal, motor or neurological deficits. No changes in his/her vision, hearing, bowel or bladder habits. He is not known to have a history of thyroid problems. No recent nose bleeds. PHYSICAL EXAM:  Vitals:    08/29/22 1244   BP: 138/76   Pulse: 59   Resp: 18   Weight: 186 lb 3.2 oz (84.5 kg)   Height: 5' 11\" (1.803 m)       GENERAL:  He is alert and oriented x 3, communicates well, in no distress. NECK:  No masses, trachea is mid position. Supple, full ROM, no JVD or bruits. No palpable thyromegaly or lymphadenopathy. HEART:  Regular rate and rhythm. Normal S1 and well-preserved S2. There is an S4 gallop and a II-III/VI mid peaking systolic ejection murmur. Possible very faint diastolic murmur. Ectopy. LUNGS:  Clear to auscultation bilaterally. No use of accessory muscles. symmetrical excursion. ABDOMEN:  Soft, non-tender. Normal bowel sounds. EXTREMITIES:  Full ROM x 4. No bilateral lower extremity edema. Good distal pulses. EYES:  Extraocular muscles intact. PERRL. Normal lids & conjunctiva. ENT:  Nares are clear & not bleeding. Moist mucosa. Normal lips formation. No external masses   NEURO: no tremors, full ROM x 4, EOMI. SKIN:  Warm, dry and intact. Normal turgor. EKG: Sinus dana, 59 bpm, nl axis, nonspecific ST - T wave changes. 2 PVCs. Assessment:   bicuspid aortic valve  Moderate AS.   Mean gradient increased to 20 mmHg on his last echo. Mild to moderate AR. Ascending aorta 3.8 x 3.9 cm.  sinus Bradycardia. Palpitations. Returned. Ectopy on exam and PVCs on his ECG. Hypertension, not well controled at this time. He states that his systolic blood pressure at home and other offices is in the 1 teens  Hypercholesterolemia        Recommendations:  He is following the cholesterol with Dr. Flynn Hayden. Holter monitor  Echocardiogram  He states that he recently started drinking caffeine. However, he discontinued the caffeine approximately 2 weeks ago and this did not help with his palpitations so far. BMP, magnesium, and TSH were recently performed and are no abnormal.        Thank you for allowing me to participate in your patient's care.       [address], [address] [facility]  Interventional Cardiology

## 2022-08-30 DIAGNOSIS — I35.9 AORTIC VALVULAR DISEASE: Primary | ICD-10-CM

## 2022-08-31 ENCOUNTER — TELEPHONE (OUTPATIENT)
Dept: CARDIOLOGY | Age: 67
End: 2022-08-31

## 2022-09-12 ENCOUNTER — TELEPHONE (OUTPATIENT)
Dept: CARDIOLOGY CLINIC | Age: 67
End: 2022-09-12

## 2022-09-12 NOTE — TELEPHONE ENCOUNTER
CAT scan 7/19/2021 shows the aorta to be at the top size of normal.  Had not increased any. Okay to skip this year.

## 2022-09-13 ENCOUNTER — HOSPITAL ENCOUNTER (OUTPATIENT)
Dept: CARDIOLOGY | Age: 67
Discharge: HOME | End: 2022-09-13
Payer: MEDICARE

## 2022-09-13 DIAGNOSIS — I35.1 AORTIC VALVE INSUFFICIENCY, ETIOLOGY OF CARDIAC VALVE DISEASE UNSPECIFIED: ICD-10-CM

## 2022-09-13 DIAGNOSIS — Q23.1 BICUSPID AORTIC VALVE: ICD-10-CM

## 2022-09-13 DIAGNOSIS — I35.0 AORTIC VALVE STENOSIS, ETIOLOGY OF CARDIAC VALVE DISEASE UNSPECIFIED: ICD-10-CM

## 2022-09-13 LAB
LV EF: 58 %
LVEF MODALITY: NORMAL

## 2022-09-13 PROCEDURE — 93306 TTE W/DOPPLER COMPLETE: CPT | Performed by: PSYCHIATRY & NEUROLOGY

## 2022-09-14 ENCOUNTER — TELEPHONE (OUTPATIENT)
Dept: CARDIOLOGY CLINIC | Age: 67
End: 2022-09-14

## 2022-09-14 DIAGNOSIS — I49.1 PAC (PREMATURE ATRIAL CONTRACTION): ICD-10-CM

## 2022-09-14 DIAGNOSIS — I77.810 ASCENDING AORTA DILATATION (HCC): Primary | ICD-10-CM

## 2022-09-14 DIAGNOSIS — I49.3 PVC'S (PREMATURE VENTRICULAR CONTRACTIONS): ICD-10-CM

## 2022-09-14 NOTE — TELEPHONE ENCOUNTER
----- Message from Hasmukh Segovia DO sent at 9/14/2022 12:30 PM EDT -----  Please let him know that the heart function is good. The aortic valve is unchanged. However, the measurements of the aorta are larger on this echo than they were on the previous echo. I was hoping to avoid a CAT scan this year but he should have a repeat CTA of his aorta regarding the above results and his history of a dilated ascending aorta.

## 2022-09-14 NOTE — TELEPHONE ENCOUNTER
Patient notified of monitor results and Dr. Luis Daniel Avila recommendation. Referral placed to EP.

## 2022-09-14 NOTE — TELEPHONE ENCOUNTER
We now discovered that he did wear a Holter monitor. The Holter monitor was never sent to me by the sleep lab to be read. I was able to read this now. Please let him know that he is having a fair amount of extra heartbeats. These are the same that he had years ago and are now returning. His heart rate is too low and therefore I do not have any room to titrate his medicine further. I recommend that he sees electrophysiology for further treatment of these PVCs and PACs. Please refer him to electrophysiology.

## 2022-09-14 NOTE — TELEPHONE ENCOUNTER
Patient notified of echo results and Dr. Johana Lobo recommendation. Order placed for CTA chest, pt will call himself to schedule. Patient asking what the recommendation is for his palpitations. Please advise.

## 2022-09-23 ENCOUNTER — OFFICE VISIT (OUTPATIENT)
Dept: NON INVASIVE DIAGNOSTICS | Age: 67
End: 2022-09-23
Payer: MEDICARE

## 2022-09-23 VITALS
WEIGHT: 187.8 LBS | DIASTOLIC BLOOD PRESSURE: 84 MMHG | HEART RATE: 49 BPM | HEIGHT: 71 IN | SYSTOLIC BLOOD PRESSURE: 138 MMHG | RESPIRATION RATE: 18 BRPM | BODY MASS INDEX: 26.29 KG/M2

## 2022-09-23 DIAGNOSIS — Q23.1 BICUSPID AORTIC VALVE: ICD-10-CM

## 2022-09-23 DIAGNOSIS — I49.1 PAC (PREMATURE ATRIAL CONTRACTION): Primary | ICD-10-CM

## 2022-09-23 DIAGNOSIS — I49.3 PVC'S (PREMATURE VENTRICULAR CONTRACTIONS): ICD-10-CM

## 2022-09-23 DIAGNOSIS — I35.0 AORTIC VALVE STENOSIS, ETIOLOGY OF CARDIAC VALVE DISEASE UNSPECIFIED: ICD-10-CM

## 2022-09-23 DIAGNOSIS — R00.2 PALPITATIONS: ICD-10-CM

## 2022-09-23 DIAGNOSIS — R94.31 ABNORMAL EKG: ICD-10-CM

## 2022-09-23 PROCEDURE — 99205 OFFICE O/P NEW HI 60 MIN: CPT | Performed by: INTERNAL MEDICINE

## 2022-09-23 PROCEDURE — 3017F COLORECTAL CA SCREEN DOC REV: CPT | Performed by: INTERNAL MEDICINE

## 2022-09-23 PROCEDURE — 93000 ELECTROCARDIOGRAM COMPLETE: CPT | Performed by: INTERNAL MEDICINE

## 2022-09-23 PROCEDURE — 1123F ACP DISCUSS/DSCN MKR DOCD: CPT | Performed by: INTERNAL MEDICINE

## 2022-09-23 PROCEDURE — 1036F TOBACCO NON-USER: CPT | Performed by: INTERNAL MEDICINE

## 2022-09-23 PROCEDURE — G8417 CALC BMI ABV UP PARAM F/U: HCPCS | Performed by: INTERNAL MEDICINE

## 2022-09-23 PROCEDURE — G8427 DOCREV CUR MEDS BY ELIG CLIN: HCPCS | Performed by: INTERNAL MEDICINE

## 2022-09-23 ASSESSMENT — ENCOUNTER SYMPTOMS
CHEST TIGHTNESS: 0
SINUS PRESSURE: 0
COLOR CHANGE: 0
COUGH: 0
WHEEZING: 0
ABDOMINAL PAIN: 0
EYE REDNESS: 0
NAUSEA: 0
DIARRHEA: 0
SHORTNESS OF BREATH: 0
ABDOMINAL DISTENTION: 0

## 2022-09-26 DIAGNOSIS — I71.20 THORACIC AORTIC ANEURYSM WITHOUT RUPTURE: ICD-10-CM

## 2022-09-26 DIAGNOSIS — I10 ESSENTIAL HYPERTENSION: Primary | ICD-10-CM

## 2022-09-28 DIAGNOSIS — I10 ESSENTIAL HYPERTENSION: ICD-10-CM

## 2022-09-28 DIAGNOSIS — I71.20 THORACIC AORTIC ANEURYSM WITHOUT RUPTURE: ICD-10-CM

## 2022-09-28 LAB
ANION GAP SERPL CALCULATED.3IONS-SCNC: 10 MMOL/L (ref 7–16)
BUN BLDV-MCNC: 17 MG/DL (ref 6–23)
CALCIUM SERPL-MCNC: 9.5 MG/DL (ref 8.6–10.2)
CHLORIDE BLD-SCNC: 100 MMOL/L (ref 98–107)
CO2: 29 MMOL/L (ref 22–29)
CREAT SERPL-MCNC: 0.9 MG/DL (ref 0.7–1.2)
GFR AFRICAN AMERICAN: >60
GFR NON-AFRICAN AMERICAN: >60 ML/MIN/1.73
GLUCOSE BLD-MCNC: 70 MG/DL (ref 74–99)
POTASSIUM SERPL-SCNC: 4.1 MMOL/L (ref 3.5–5)
SODIUM BLD-SCNC: 139 MMOL/L (ref 132–146)

## 2022-10-02 ENCOUNTER — HOSPITAL ENCOUNTER (OUTPATIENT)
Dept: CT IMAGING | Age: 67
Discharge: HOME | End: 2022-10-04
Payer: MEDICARE

## 2022-10-02 DIAGNOSIS — I77.810 ASCENDING AORTA DILATATION (HCC): ICD-10-CM

## 2022-10-02 PROCEDURE — 71275 CT ANGIOGRAPHY CHEST: CPT

## 2022-10-02 PROCEDURE — 6360000004 HC RX CONTRAST MEDICATION: Performed by: RADIOLOGY

## 2022-10-02 RX ADMIN — IOPAMIDOL 75 ML: 755 INJECTION, SOLUTION INTRAVENOUS at 07:43

## 2022-10-05 ENCOUNTER — TELEPHONE (OUTPATIENT)
Dept: CARDIOLOGY CLINIC | Age: 67
End: 2022-10-05

## 2022-10-05 NOTE — TELEPHONE ENCOUNTER
----- Message from Tasia Vigil DO sent at 10/4/2022  3:06 PM EDT -----  Let him know that the aorta measures 4.1 cm. This is the same as last year. It has not changed any.

## 2022-12-12 RX ORDER — METOPROLOL SUCCINATE 25 MG/1
TABLET, EXTENDED RELEASE ORAL
Qty: 45 TABLET | Refills: 3 | Status: SHIPPED | OUTPATIENT
Start: 2022-12-12

## 2022-12-12 RX ORDER — PANTOPRAZOLE SODIUM 40 MG/1
TABLET, DELAYED RELEASE ORAL
Qty: 90 TABLET | Refills: 3 | Status: SHIPPED | OUTPATIENT
Start: 2022-12-12

## 2022-12-12 RX ORDER — TERAZOSIN 5 MG/1
CAPSULE ORAL
Qty: 90 CAPSULE | Refills: 3 | Status: SHIPPED | OUTPATIENT
Start: 2022-12-12

## 2022-12-12 RX ORDER — ATORVASTATIN CALCIUM 10 MG/1
TABLET, FILM COATED ORAL
Qty: 90 TABLET | Refills: 3 | Status: SHIPPED | OUTPATIENT
Start: 2022-12-12

## 2023-02-01 ENCOUNTER — TELEPHONE (OUTPATIENT)
Dept: CARDIOLOGY CLINIC | Age: 68
End: 2023-02-01

## 2023-02-01 NOTE — TELEPHONE ENCOUNTER
He would be considered low risk for perioperative cardiac events for cataract procedure. No preop cardiac testing is needed.

## 2023-02-01 NOTE — TELEPHONE ENCOUNTER
Patient needs cardiac clearance for cataract surgery by Dr. José Luis Mojica at [facility] under [address]. Patient denies any chest pain, shortness of breath or palpitations since last visit in August 2022. Patient is able to complete all activities of daily living, including; climbing one flight of stairs and walking one block without cardiac symptoms. Patient on Aspirin. Please advise.

## 2023-02-02 NOTE — TELEPHONE ENCOUNTER
Patient notified of Dr. Bisi Soria risk assessment for surgery. Note faxed to [address] [facility] [phone].

## 2023-04-14 PROBLEM — B96.89 ACUTE BACTERIAL SINUSITIS: Status: ACTIVE | Noted: 2023-04-14

## 2023-04-14 PROBLEM — R05.1 ACUTE COUGH: Status: ACTIVE | Noted: 2023-04-14

## 2023-04-14 PROBLEM — J01.90 ACUTE BACTERIAL SINUSITIS: Status: ACTIVE | Noted: 2023-04-14

## 2023-06-10 ENCOUNTER — OFFICE VISIT (OUTPATIENT)
Dept: FAMILY MEDICINE CLINIC | Age: 68
End: 2023-06-10

## 2023-06-10 VITALS
HEART RATE: 58 BPM | SYSTOLIC BLOOD PRESSURE: 110 MMHG | BODY MASS INDEX: 26.08 KG/M2 | TEMPERATURE: 98.4 F | OXYGEN SATURATION: 95 % | WEIGHT: 187 LBS | DIASTOLIC BLOOD PRESSURE: 82 MMHG

## 2023-06-10 DIAGNOSIS — J06.9 VIRAL URI: Primary | ICD-10-CM

## 2023-06-10 DIAGNOSIS — J30.1 SEASONAL ALLERGIC RHINITIS DUE TO POLLEN: ICD-10-CM

## 2023-06-10 ASSESSMENT — ENCOUNTER SYMPTOMS
EYE PAIN: 0
SORE THROAT: 0
WHEEZING: 0
DIARRHEA: 0
NAUSEA: 0
SHORTNESS OF BREATH: 0
VOMITING: 0
ABDOMINAL PAIN: 0
BACK PAIN: 0
CONSTIPATION: 0
COUGH: 1
SINUS PAIN: 0

## 2023-06-10 NOTE — PROGRESS NOTES
nursing note reviewed. Constitutional:       General: He is not in acute distress. Appearance: He is well-developed. He is not ill-appearing or toxic-appearing. HENT:      Head: Normocephalic and atraumatic. Right Ear: Tympanic membrane normal.      Left Ear: Tympanic membrane normal.      Nose: Nose normal.      Mouth/Throat:      Pharynx: No oropharyngeal exudate or posterior oropharyngeal erythema. Eyes:      Pupils: Pupils are equal, round, and reactive to light. Cardiovascular:      Rate and Rhythm: Normal rate and regular rhythm. Pulmonary:      Effort: Pulmonary effort is normal. No respiratory distress. Breath sounds: Normal breath sounds. No wheezing or rhonchi. Musculoskeletal:      Cervical back: Normal range of motion. Skin:     General: Skin is warm and dry. Neurological:      Mental Status: He is alert. An electronic signature was used to authenticate this note.     --Gibson Clarke, DO

## 2023-06-20 ENCOUNTER — OFFICE VISIT (OUTPATIENT)
Dept: FAMILY MEDICINE CLINIC | Age: 68
End: 2023-06-20
Payer: MEDICARE

## 2023-06-20 VITALS
BODY MASS INDEX: 25.9 KG/M2 | SYSTOLIC BLOOD PRESSURE: 124 MMHG | TEMPERATURE: 98 F | OXYGEN SATURATION: 96 % | DIASTOLIC BLOOD PRESSURE: 74 MMHG | WEIGHT: 185 LBS | HEART RATE: 50 BPM | HEIGHT: 71 IN

## 2023-06-20 DIAGNOSIS — Q23.1 BICUSPID AORTIC VALVE: ICD-10-CM

## 2023-06-20 DIAGNOSIS — I10 ESSENTIAL HYPERTENSION: ICD-10-CM

## 2023-06-20 DIAGNOSIS — R05.1 ACUTE COUGH: Primary | ICD-10-CM

## 2023-06-20 PROCEDURE — 99214 OFFICE O/P EST MOD 30 MIN: CPT | Performed by: FAMILY MEDICINE

## 2023-06-20 PROCEDURE — 3074F SYST BP LT 130 MM HG: CPT | Performed by: FAMILY MEDICINE

## 2023-06-20 PROCEDURE — G8427 DOCREV CUR MEDS BY ELIG CLIN: HCPCS | Performed by: FAMILY MEDICINE

## 2023-06-20 PROCEDURE — 3017F COLORECTAL CA SCREEN DOC REV: CPT | Performed by: FAMILY MEDICINE

## 2023-06-20 PROCEDURE — 3078F DIAST BP <80 MM HG: CPT | Performed by: FAMILY MEDICINE

## 2023-06-20 PROCEDURE — 1123F ACP DISCUSS/DSCN MKR DOCD: CPT | Performed by: FAMILY MEDICINE

## 2023-06-20 PROCEDURE — G8417 CALC BMI ABV UP PARAM F/U: HCPCS | Performed by: FAMILY MEDICINE

## 2023-06-20 PROCEDURE — 1036F TOBACCO NON-USER: CPT | Performed by: FAMILY MEDICINE

## 2023-06-20 RX ORDER — PREDNISONE 10 MG/1
TABLET ORAL
Qty: 30 TABLET | Refills: 0 | Status: SHIPPED | OUTPATIENT
Start: 2023-06-20

## 2023-06-20 RX ORDER — AZITHROMYCIN 250 MG/1
TABLET, FILM COATED ORAL
Qty: 1 PACKET | Refills: 1 | Status: SHIPPED | OUTPATIENT
Start: 2023-06-20

## 2023-06-20 ASSESSMENT — ENCOUNTER SYMPTOMS
WHEEZING: 1
COUGH: 1
GASTROINTESTINAL NEGATIVE: 1
EYES NEGATIVE: 1
ALLERGIC/IMMUNOLOGIC NEGATIVE: 1

## 2023-06-20 NOTE — PROGRESS NOTES
23     Kuwlant Mena    : 1955 Sex: male   Age: 70 y.o. Chief Complaint   Patient presents with    Cough     Has had cough for about a month. Prior to Admission medications    Medication Sig Start Date End Date Taking? Authorizing Provider   azithromycin (ZITHROMAX Z-MIGDALIA) 250 MG tablet 2 today  Then 1 qd  4 days 23  Yes Georgia Wilson DO   predniSONE (DELTASONE) 10 MG tablet 3 qd x5days then 2 qd x5days then 1 qd x5 days 23  Yes Georgia Wilson DO   metoprolol succinate (TOPROL XL) 25 MG extended release tablet TAKE ONE-HALF (1/2) TABLET DAILY 22  Yes Georgia Wilson DO   pantoprazole (PROTONIX) 40 MG tablet TAKE 1 TABLET DAILY 22  Yes Georgia Wilson DO   terazosin (HYTRIN) 5 MG capsule TAKE 1 CAPSULE NIGHTLY 22  Yes Georgia Wilson DO   atorvastatin (LIPITOR) 10 MG tablet TAKE 1 TABLET DAILY 22  Yes Georgia Wilson DO   aspirin 81 MG EC tablet Take 1 tablet by mouth daily   Yes Historical Provider, MD   Multiple Vitamins-Minerals (MULTIVITAMIN PO) Take  by mouth daily. Yes Historical Provider, MD          HPI: Patient seen today upper respiratory cough and congestion. Onset of symptoms over the past couple weeks. Recently treated for allergy related complaints but this is more consistent with acute bronchitis. Treatment with Z-Migdalia prednisone weaning course and then will follow-up if persistent. Hypertension good control. Aortic valve disease stable. Cardiac rhythm remains stable. Review of Systems   Constitutional: Negative. HENT:  Positive for congestion. Eyes: Negative. Respiratory:  Positive for cough and wheezing. Gastrointestinal: Negative. Endocrine: Negative. Genitourinary: Negative. Musculoskeletal: Negative. Skin: Negative. Allergic/Immunologic: Negative. Neurological: Negative. Hematological: Negative. Psychiatric/Behavioral: Negative.               Current Outpatient

## 2023-09-25 ENCOUNTER — TELEPHONE (OUTPATIENT)
Dept: CARDIOLOGY CLINIC | Age: 68
End: 2023-09-25

## 2023-09-26 DIAGNOSIS — I77.810 ASCENDING AORTA DILATATION (HCC): Primary | ICD-10-CM

## 2023-09-26 DIAGNOSIS — Q23.1 BICUSPID AORTIC VALVE: ICD-10-CM

## 2023-09-26 DIAGNOSIS — I35.0 AORTIC VALVE STENOSIS, ETIOLOGY OF CARDIAC VALVE DISEASE UNSPECIFIED: ICD-10-CM

## 2023-10-02 ENCOUNTER — OFFICE VISIT (OUTPATIENT)
Dept: FAMILY MEDICINE CLINIC | Age: 68
End: 2023-10-02
Payer: MEDICARE

## 2023-10-02 VITALS
HEART RATE: 60 BPM | DIASTOLIC BLOOD PRESSURE: 78 MMHG | TEMPERATURE: 98 F | OXYGEN SATURATION: 96 % | BODY MASS INDEX: 27.02 KG/M2 | SYSTOLIC BLOOD PRESSURE: 134 MMHG | HEIGHT: 71 IN | WEIGHT: 193 LBS

## 2023-10-02 DIAGNOSIS — I10 ESSENTIAL HYPERTENSION: ICD-10-CM

## 2023-10-02 DIAGNOSIS — J30.1 SEASONAL ALLERGIC RHINITIS DUE TO POLLEN: Primary | ICD-10-CM

## 2023-10-02 DIAGNOSIS — H10.023 OTHER MUCOPURULENT CONJUNCTIVITIS OF BOTH EYES: ICD-10-CM

## 2023-10-02 PROBLEM — H10.9 CONJUNCTIVITIS: Status: ACTIVE | Noted: 2023-10-02

## 2023-10-02 PROCEDURE — 1036F TOBACCO NON-USER: CPT | Performed by: FAMILY MEDICINE

## 2023-10-02 PROCEDURE — 99213 OFFICE O/P EST LOW 20 MIN: CPT | Performed by: FAMILY MEDICINE

## 2023-10-02 PROCEDURE — 3017F COLORECTAL CA SCREEN DOC REV: CPT | Performed by: FAMILY MEDICINE

## 2023-10-02 PROCEDURE — G8427 DOCREV CUR MEDS BY ELIG CLIN: HCPCS | Performed by: FAMILY MEDICINE

## 2023-10-02 PROCEDURE — 3078F DIAST BP <80 MM HG: CPT | Performed by: FAMILY MEDICINE

## 2023-10-02 PROCEDURE — G8417 CALC BMI ABV UP PARAM F/U: HCPCS | Performed by: FAMILY MEDICINE

## 2023-10-02 PROCEDURE — 3075F SYST BP GE 130 - 139MM HG: CPT | Performed by: FAMILY MEDICINE

## 2023-10-02 PROCEDURE — G8484 FLU IMMUNIZE NO ADMIN: HCPCS | Performed by: FAMILY MEDICINE

## 2023-10-02 PROCEDURE — 1123F ACP DISCUSS/DSCN MKR DOCD: CPT | Performed by: FAMILY MEDICINE

## 2023-10-02 RX ORDER — TOBRAMYCIN 3 MG/ML
SOLUTION/ DROPS OPHTHALMIC
Qty: 5 ML | Refills: 0 | Status: SHIPPED | OUTPATIENT
Start: 2023-10-02

## 2023-10-02 RX ORDER — CETIRIZINE HYDROCHLORIDE 10 MG/1
10 TABLET ORAL DAILY
Qty: 30 TABLET | Refills: 0 | Status: SHIPPED | OUTPATIENT
Start: 2023-10-02

## 2023-10-02 RX ORDER — PREDNISONE 10 MG/1
TABLET ORAL
Qty: 14 TABLET | Refills: 0 | Status: SHIPPED | OUTPATIENT
Start: 2023-10-02

## 2023-10-02 ASSESSMENT — ENCOUNTER SYMPTOMS
EYES NEGATIVE: 1
GASTROINTESTINAL NEGATIVE: 1
ALLERGIC/IMMUNOLOGIC NEGATIVE: 1
RESPIRATORY NEGATIVE: 1

## 2023-10-02 NOTE — PROGRESS NOTES
10/2/23     Vamsi Garcia    : 1955 Sex: male   Age: 70 y.o. Chief Complaint   Patient presents with    Conjunctivitis       Prior to Admission medications    Medication Sig Start Date End Date Taking? Authorizing Provider   tobramycin (TOBREX) 0.3 % ophthalmic solution 2 gtts qid 10/2/23  Yes Facundo Wilson, DO   predniSONE (DELTASONE) 10 MG tablet 2 qd  x 7days 10/2/23  Yes Shan Wilson DO   cetirizine (ZYRTEC) 10 MG tablet Take 1 tablet by mouth daily 10/2/23  Yes Shan Wilson DO   metoprolol succinate (TOPROL XL) 25 MG extended release tablet TAKE ONE-HALF (1/2) TABLET DAILY 22  Yes Shan Wilson DO   pantoprazole (PROTONIX) 40 MG tablet TAKE 1 TABLET DAILY 22  Yes Shan Wilson DO   terazosin (HYTRIN) 5 MG capsule TAKE 1 CAPSULE NIGHTLY 22  Yes Shan Wilson DO   atorvastatin (LIPITOR) 10 MG tablet TAKE 1 TABLET DAILY 22  Yes Shan Wilson DO   aspirin 81 MG EC tablet Take 1 tablet by mouth daily   Yes Historical Provider, MD   Multiple Vitamins-Minerals (MULTIVITAMIN PO) Take  by mouth daily. Yes Historical Provider, MD          HPI: Patient evaluated today problems with bilateral eye inflammation conjunctivitis most likely allergy induced but some evidence of mucus drainage consistent with bacterial conjunctivitis. Treatment with Tobrex drops prednisone and Benadryl or Zyrtec. Review of Systems   Constitutional: Negative. HENT: Negative. Eyes: Negative. Respiratory: Negative. Gastrointestinal: Negative. Endocrine: Negative. Genitourinary: Negative. Musculoskeletal: Negative. Skin: Negative. Allergic/Immunologic: Negative. Neurological: Negative. Hematological: Negative. Psychiatric/Behavioral: Negative. Today systems review stable aside from bilateral eye inflammation.         Current Outpatient Medications:     tobramycin (TOBREX) 0.3 % ophthalmic solution, 2 gtts qid, Disp: 5 mL,

## 2023-10-07 SDOH — HEALTH STABILITY: PHYSICAL HEALTH: ON AVERAGE, HOW MANY DAYS PER WEEK DO YOU ENGAGE IN MODERATE TO STRENUOUS EXERCISE (LIKE A BRISK WALK)?: 7 DAYS

## 2023-10-07 SDOH — HEALTH STABILITY: PHYSICAL HEALTH: ON AVERAGE, HOW MANY MINUTES DO YOU ENGAGE IN EXERCISE AT THIS LEVEL?: 40 MIN

## 2023-10-07 ASSESSMENT — LIFESTYLE VARIABLES
HOW MANY STANDARD DRINKS CONTAINING ALCOHOL DO YOU HAVE ON A TYPICAL DAY: 1
HOW OFTEN DO YOU HAVE A DRINK CONTAINING ALCOHOL: 4
HOW MANY STANDARD DRINKS CONTAINING ALCOHOL DO YOU HAVE ON A TYPICAL DAY: 1 OR 2
HOW OFTEN DO YOU HAVE SIX OR MORE DRINKS ON ONE OCCASION: 1
HOW OFTEN DO YOU HAVE A DRINK CONTAINING ALCOHOL: 2-3 TIMES A WEEK

## 2023-10-09 ENCOUNTER — TELEPHONE (OUTPATIENT)
Dept: PRIMARY CARE CLINIC | Age: 68
End: 2023-10-09

## 2023-10-09 DIAGNOSIS — I10 ESSENTIAL HYPERTENSION: ICD-10-CM

## 2023-10-09 DIAGNOSIS — Z12.5 PROSTATE CANCER SCREENING: ICD-10-CM

## 2023-10-09 DIAGNOSIS — E78.00 PURE HYPERCHOLESTEROLEMIA: ICD-10-CM

## 2023-10-09 DIAGNOSIS — I35.0 NONRHEUMATIC AORTIC VALVE STENOSIS: ICD-10-CM

## 2023-10-09 DIAGNOSIS — E78.00 PURE HYPERCHOLESTEROLEMIA: Primary | ICD-10-CM

## 2023-10-09 LAB
ABSOLUTE IMMATURE GRANULOCYTE: 0.05 K/UL (ref 0–0.58)
ALBUMIN SERPL-MCNC: 4.5 G/DL (ref 3.5–5.2)
ALP BLD-CCNC: 91 U/L (ref 40–129)
ALT SERPL-CCNC: 15 U/L (ref 0–40)
ANION GAP SERPL CALCULATED.3IONS-SCNC: 17 MMOL/L (ref 7–16)
AST SERPL-CCNC: 19 U/L (ref 0–39)
BASOPHILS ABSOLUTE: 0.06 K/UL (ref 0–0.2)
BASOPHILS RELATIVE PERCENT: 1 % (ref 0–2)
BILIRUB SERPL-MCNC: 0.8 MG/DL (ref 0–1.2)
BUN BLDV-MCNC: 15 MG/DL (ref 6–23)
CALCIUM SERPL-MCNC: 9.6 MG/DL (ref 8.6–10.2)
CHLORIDE BLD-SCNC: 99 MMOL/L (ref 98–107)
CHOLESTEROL: 207 MG/DL
CO2: 21 MMOL/L (ref 22–29)
CREAT SERPL-MCNC: 1 MG/DL (ref 0.7–1.2)
EOSINOPHILS ABSOLUTE: 0.01 K/UL (ref 0.05–0.5)
EOSINOPHILS RELATIVE PERCENT: 0 % (ref 0–6)
GFR SERPL CREATININE-BSD FRML MDRD: >60 ML/MIN/1.73M2
GLUCOSE BLD-MCNC: 103 MG/DL (ref 74–99)
HCT VFR BLD CALC: 47.8 % (ref 37–54)
HDLC SERPL-MCNC: 91 MG/DL
HEMOGLOBIN: 15.4 G/DL (ref 12.5–16.5)
IMMATURE GRANULOCYTES: 1 % (ref 0–5)
LDL CHOLESTEROL: 105 MG/DL
LYMPHOCYTES ABSOLUTE: 1.2 K/UL (ref 1.5–4)
LYMPHOCYTES RELATIVE PERCENT: 13 % (ref 20–42)
MCH RBC QN AUTO: 32.4 PG (ref 26–35)
MCHC RBC AUTO-ENTMCNC: 32.2 G/DL (ref 32–34.5)
MCV RBC AUTO: 100.6 FL (ref 80–99.9)
MONOCYTES ABSOLUTE: 0.35 K/UL (ref 0.1–0.95)
MONOCYTES RELATIVE PERCENT: 4 % (ref 2–12)
NEUTROPHILS ABSOLUTE: 7.7 K/UL (ref 1.8–7.3)
NEUTROPHILS RELATIVE PERCENT: 82 % (ref 43–80)
PDW BLD-RTO: 12.4 % (ref 11.5–15)
PLATELET # BLD: 221 K/UL (ref 130–450)
PMV BLD AUTO: 11.2 FL (ref 7–12)
POTASSIUM SERPL-SCNC: 4.8 MMOL/L (ref 3.5–5)
PROSTATE SPECIFIC ANTIGEN: 0.71 NG/ML (ref 0–4)
RBC # BLD: 4.75 M/UL (ref 3.8–5.8)
SODIUM BLD-SCNC: 137 MMOL/L (ref 132–146)
T4 TOTAL: 7.6 UG/DL (ref 4.5–11.7)
TOTAL PROTEIN: 7.1 G/DL (ref 6.4–8.3)
TRIGL SERPL-MCNC: 53 MG/DL
TSH SERPL DL<=0.05 MIU/L-ACNC: 0.89 UIU/ML (ref 0.27–4.2)
VLDLC SERPL CALC-MCNC: 11 MG/DL
WBC # BLD: 9.4 K/UL (ref 4.5–11.5)

## 2023-10-09 NOTE — TELEPHONE ENCOUNTER
The pt has an appointment with you on Friday he is calling to see if you can order him some labs including a PSA so he can get them done before his appointment

## 2023-10-13 ENCOUNTER — OFFICE VISIT (OUTPATIENT)
Dept: PRIMARY CARE CLINIC | Age: 68
End: 2023-10-13

## 2023-10-13 VITALS
DIASTOLIC BLOOD PRESSURE: 80 MMHG | WEIGHT: 191 LBS | HEIGHT: 71 IN | OXYGEN SATURATION: 96 % | BODY MASS INDEX: 26.74 KG/M2 | HEART RATE: 97 BPM | TEMPERATURE: 97.8 F | SYSTOLIC BLOOD PRESSURE: 128 MMHG

## 2023-10-13 DIAGNOSIS — L98.9 SKIN LESION OF CHEST WALL: ICD-10-CM

## 2023-10-13 DIAGNOSIS — E78.00 PURE HYPERCHOLESTEROLEMIA: ICD-10-CM

## 2023-10-13 DIAGNOSIS — I10 ESSENTIAL HYPERTENSION: ICD-10-CM

## 2023-10-13 DIAGNOSIS — Z00.00 ANNUAL PHYSICAL EXAM: Primary | ICD-10-CM

## 2023-10-13 DIAGNOSIS — Q23.1 BICUSPID AORTIC VALVE: ICD-10-CM

## 2023-10-13 DIAGNOSIS — K21.00 GASTROESOPHAGEAL REFLUX DISEASE WITH ESOPHAGITIS WITHOUT HEMORRHAGE: ICD-10-CM

## 2023-10-13 LAB
BILIRUBIN, POC: NORMAL
BLOOD URINE, POC: NORMAL
CLARITY, POC: CLEAR
COLOR, POC: YELLOW
GLUCOSE URINE, POC: NORMAL
KETONES, POC: NORMAL
LEUKOCYTE EST, POC: NORMAL
NITRITE, POC: NORMAL
PH, POC: 7
PROTEIN, POC: NORMAL
SPECIFIC GRAVITY, POC: 1.02
UROBILINOGEN, POC: 0.2

## 2023-10-13 ASSESSMENT — ENCOUNTER SYMPTOMS
EYES NEGATIVE: 1
GASTROINTESTINAL NEGATIVE: 1
RESPIRATORY NEGATIVE: 1
ALLERGIC/IMMUNOLOGIC NEGATIVE: 1

## 2023-10-13 NOTE — PROGRESS NOTES
10/13/23     Vamsi Garcia    : 1955 Sex: male   Age: 70 y.o. Chief Complaint   Patient presents with    Hyperlipidemia    Discuss Labs    Annual Exam       Prior to Admission medications    Medication Sig Start Date End Date Taking? Authorizing Provider   tobramycin (TOBREX) 0.3 % ophthalmic solution 2 gtts qid 10/2/23  Yes Shan Wilson DO   predniSONE (DELTASONE) 10 MG tablet 2 qd  x 7days 10/2/23  Yes Shan Wilson DO   cetirizine (ZYRTEC) 10 MG tablet Take 1 tablet by mouth daily 10/2/23  Yes Shan Wilson DO   metoprolol succinate (TOPROL XL) 25 MG extended release tablet TAKE ONE-HALF (1/2) TABLET DAILY 22  Yes Esther Li DO   pantoprazole (PROTONIX) 40 MG tablet TAKE 1 TABLET DAILY 22  Yes Esther Li DO   terazosin (HYTRIN) 5 MG capsule TAKE 1 CAPSULE NIGHTLY 22  Yes Facundo Wilson,    atorvastatin (LIPITOR) 10 MG tablet TAKE 1 TABLET DAILY 22  Yes Esther Li DO   aspirin 81 MG EC tablet Take 1 tablet by mouth daily   Yes ProviderGwendolyn MD   Multiple Vitamins-Minerals (MULTIVITAMIN PO) Take  by mouth daily. Yes ProviderGwendolyn MD          HPI: She is seen today health maintenance physical hypertensive disease hyperlipidemia history of bicuspid aortic valve and history of thoracic aortic aneurysm at 4 cm and essentially unchanged on last CT angiogram of the chest.  He is due for repeat CTA as well as an echocardiogram and then will be following up with cardiology. Skin lesions currently on the chest consistent with possible squamous cell cancers and I am recommending dermatology referral.  Physically he feels well has been doing well and medications are all currently well-tolerated. Labs reviewed with him today are well controlled we will maintain current meds and current dosing. Review of Systems   Constitutional: Negative. HENT: Negative. Eyes: Negative. Respiratory: Negative.

## 2023-10-27 ENCOUNTER — HOSPITAL ENCOUNTER (OUTPATIENT)
Dept: CT IMAGING | Age: 68
End: 2023-10-27
Attending: INTERNAL MEDICINE
Payer: MEDICARE

## 2023-10-27 DIAGNOSIS — I77.810 ASCENDING AORTA DILATATION (HCC): ICD-10-CM

## 2023-10-27 DIAGNOSIS — I35.0 AORTIC VALVE STENOSIS, ETIOLOGY OF CARDIAC VALVE DISEASE UNSPECIFIED: ICD-10-CM

## 2023-10-27 PROCEDURE — 6360000004 HC RX CONTRAST MEDICATION: Performed by: RADIOLOGY

## 2023-10-27 PROCEDURE — G1010 CDSM STANSON: HCPCS

## 2023-10-27 RX ADMIN — IOPAMIDOL 75 ML: 755 INJECTION, SOLUTION INTRAVENOUS at 07:51

## 2023-10-30 ENCOUNTER — TELEPHONE (OUTPATIENT)
Dept: CARDIOLOGY CLINIC | Age: 68
End: 2023-10-30

## 2023-10-30 NOTE — TELEPHONE ENCOUNTER
----- Message from Amado Licona DO sent at 10/30/2023  3:54 PM EDT -----  Let him know that the aorta has increased in size just slightly from 4.1 cm to 4.2 cm. This is not much of a change and we can continue to watch.

## 2023-11-17 ENCOUNTER — TELEPHONE (OUTPATIENT)
Dept: CARDIOLOGY CLINIC | Age: 68
End: 2023-11-17

## 2023-11-17 ENCOUNTER — HOSPITAL ENCOUNTER (OUTPATIENT)
Dept: CARDIOLOGY | Age: 68
End: 2023-11-17
Attending: INTERNAL MEDICINE
Payer: MEDICARE

## 2023-11-17 VITALS
BODY MASS INDEX: 26.74 KG/M2 | HEIGHT: 71 IN | DIASTOLIC BLOOD PRESSURE: 80 MMHG | SYSTOLIC BLOOD PRESSURE: 128 MMHG | WEIGHT: 191 LBS

## 2023-11-17 DIAGNOSIS — I77.810 ASCENDING AORTA DILATATION (HCC): ICD-10-CM

## 2023-11-17 DIAGNOSIS — Q23.1 BICUSPID AORTIC VALVE: ICD-10-CM

## 2023-11-17 LAB
ECHO AO ASC DIAM: 3.6 CM
ECHO AO ASCENDING AORTA INDEX: 1.74 CM/M2
ECHO AV AREA PEAK VELOCITY: 0.9 CM2
ECHO AV AREA VTI: 1.1 CM2
ECHO AV AREA/BSA PEAK VELOCITY: 0.4 CM2/M2
ECHO AV AREA/BSA VTI: 0.5 CM2/M2
ECHO AV CUSP MM: 1.7 CM
ECHO AV MEAN GRADIENT: 29 MMHG
ECHO AV MEAN VELOCITY: 2.6 M/S
ECHO AV PEAK GRADIENT: 52 MMHG
ECHO AV PEAK VELOCITY: 3.6 M/S
ECHO AV VELOCITY RATIO: 0.19
ECHO AV VTI: 86.9 CM
ECHO BSA: 2.08 M2
ECHO EST RA PRESSURE: 3 MMHG
ECHO LA DIAMETER INDEX: 1.45 CM/M2
ECHO LA DIAMETER: 3 CM
ECHO LA VOL A-L A2C: 44 ML (ref 18–58)
ECHO LA VOL A-L A4C: 33 ML (ref 18–58)
ECHO LA VOL MOD A2C: 44 ML (ref 18–58)
ECHO LA VOL MOD A4C: 29 ML (ref 18–58)
ECHO LA VOLUME AREA LENGTH: 39 ML
ECHO LA VOLUME INDEX A-L A2C: 21 ML/M2 (ref 16–34)
ECHO LA VOLUME INDEX A-L A4C: 16 ML/M2 (ref 16–34)
ECHO LA VOLUME INDEX AREA LENGTH: 19 ML/M2 (ref 16–34)
ECHO LA VOLUME INDEX MOD A2C: 21 ML/M2 (ref 16–34)
ECHO LA VOLUME INDEX MOD A4C: 14 ML/M2 (ref 16–34)
ECHO LV EF PHYSICIAN: 55 %
ECHO LV FRACTIONAL SHORTENING: 26 % (ref 28–44)
ECHO LV INTERNAL DIMENSION DIASTOLE INDEX: 2.03 CM/M2
ECHO LV INTERNAL DIMENSION DIASTOLIC: 4.2 CM (ref 4.2–5.9)
ECHO LV INTERNAL DIMENSION SYSTOLIC INDEX: 1.5 CM/M2
ECHO LV INTERNAL DIMENSION SYSTOLIC: 3.1 CM
ECHO LV ISOVOLUMETRIC RELAXATION TIME (IVRT): 69.2 MS
ECHO LV IVSD: 1.4 CM (ref 0.6–1)
ECHO LV IVSS: 1.6 CM
ECHO LV MASS 2D: 224.3 G (ref 88–224)
ECHO LV MASS INDEX 2D: 108.4 G/M2 (ref 49–115)
ECHO LV POSTERIOR WALL DIASTOLIC: 1.4 CM (ref 0.6–1)
ECHO LV POSTERIOR WALL SYSTOLIC: 1.7 CM
ECHO LV RELATIVE WALL THICKNESS RATIO: 0.67
ECHO LVOT AREA: 4.5 CM2
ECHO LVOT AV VTI INDEX: 0.24
ECHO LVOT DIAM: 2.4 CM
ECHO LVOT MEAN GRADIENT: 1 MMHG
ECHO LVOT PEAK GRADIENT: 2 MMHG
ECHO LVOT PEAK VELOCITY: 0.7 M/S
ECHO LVOT STROKE VOLUME INDEX: 45.9 ML/M2
ECHO LVOT SV: 95 ML
ECHO LVOT VTI: 21 CM
ECHO MV "A" WAVE DURATION: 138.4 MSEC
ECHO MV A VELOCITY: 0.61 M/S
ECHO MV AREA PHT: 1.7 CM2
ECHO MV AREA VTI: 3.3 CM2
ECHO MV E DECELERATION TIME (DT): 258 MS
ECHO MV E VELOCITY: 0.73 M/S
ECHO MV E/A RATIO: 1.2
ECHO MV LVOT VTI INDEX: 1.39
ECHO MV MAX VELOCITY: 0.8 M/S
ECHO MV MEAN GRADIENT: 1 MMHG
ECHO MV MEAN VELOCITY: 0.5 M/S
ECHO MV PEAK GRADIENT: 2 MMHG
ECHO MV PRESSURE HALF TIME (PHT): 126.4 MS
ECHO MV VTI: 29.1 CM
ECHO PULMONARY ARTERY END DIASTOLIC PRESSURE: 5 MMHG
ECHO PV MAX VELOCITY: 1 M/S
ECHO PV MEAN GRADIENT: 2 MMHG
ECHO PV MEAN VELOCITY: 0.6 M/S
ECHO PV PEAK GRADIENT: 4 MMHG
ECHO PV REGURGITANT MAX VELOCITY: 1.1 M/S
ECHO PV VTI: 21.8 CM
ECHO PVEIN A VELOCITY: 0.3 M/S
ECHO PVEIN PEAK D VELOCITY: 0.6 M/S
ECHO PVEIN PEAK S VELOCITY: 0.7 M/S
ECHO PVEIN S/D RATIO: 1.2
ECHO RIGHT VENTRICULAR SYSTOLIC PRESSURE (RVSP): 38 MMHG
ECHO RV INTERNAL DIMENSION: 3.2 CM
ECHO TV REGURGITANT MAX VELOCITY: 2.94 M/S
ECHO TV REGURGITANT PEAK GRADIENT: 35 MMHG

## 2023-11-17 PROCEDURE — 93306 TTE W/DOPPLER COMPLETE: CPT | Performed by: INTERNAL MEDICINE

## 2023-11-17 PROCEDURE — 93306 TTE W/DOPPLER COMPLETE: CPT

## 2023-11-17 NOTE — TELEPHONE ENCOUNTER
----- Message from Lulu Velez DO sent at 11/17/2023  4:27 PM EST -----  Let him know that the heart function is good. The bicuspid aortic valve with moderate aortic stenosis and mild to moderate aortic regurgitation still shows moderate aortic stenosis and mild regurgitation. Therefore, no increase. The aorta is mildly dilated. With his bicuspid aortic valve he needs to have a CTA of the aorta to evaluate for the dilated aortic root and ascending aorta.

## 2023-11-20 ENCOUNTER — OFFICE VISIT (OUTPATIENT)
Dept: FAMILY MEDICINE CLINIC | Age: 68
End: 2023-11-20
Payer: MEDICARE

## 2023-11-20 VITALS
TEMPERATURE: 98 F | OXYGEN SATURATION: 96 % | SYSTOLIC BLOOD PRESSURE: 100 MMHG | WEIGHT: 197 LBS | BODY MASS INDEX: 27.58 KG/M2 | HEIGHT: 71 IN | DIASTOLIC BLOOD PRESSURE: 60 MMHG | HEART RATE: 71 BPM

## 2023-11-20 DIAGNOSIS — H01.001 BLEPHARITIS OF UPPER EYELIDS OF BOTH EYES, UNSPECIFIED TYPE: Primary | ICD-10-CM

## 2023-11-20 DIAGNOSIS — H01.004 BLEPHARITIS OF UPPER EYELIDS OF BOTH EYES, UNSPECIFIED TYPE: Primary | ICD-10-CM

## 2023-11-20 DIAGNOSIS — I35.0 NONRHEUMATIC AORTIC VALVE STENOSIS: ICD-10-CM

## 2023-11-20 PROCEDURE — 1123F ACP DISCUSS/DSCN MKR DOCD: CPT | Performed by: FAMILY MEDICINE

## 2023-11-20 PROCEDURE — 3078F DIAST BP <80 MM HG: CPT | Performed by: FAMILY MEDICINE

## 2023-11-20 PROCEDURE — G8484 FLU IMMUNIZE NO ADMIN: HCPCS | Performed by: FAMILY MEDICINE

## 2023-11-20 PROCEDURE — 1036F TOBACCO NON-USER: CPT | Performed by: FAMILY MEDICINE

## 2023-11-20 PROCEDURE — G8427 DOCREV CUR MEDS BY ELIG CLIN: HCPCS | Performed by: FAMILY MEDICINE

## 2023-11-20 PROCEDURE — 3017F COLORECTAL CA SCREEN DOC REV: CPT | Performed by: FAMILY MEDICINE

## 2023-11-20 PROCEDURE — 3074F SYST BP LT 130 MM HG: CPT | Performed by: FAMILY MEDICINE

## 2023-11-20 PROCEDURE — 99213 OFFICE O/P EST LOW 20 MIN: CPT | Performed by: FAMILY MEDICINE

## 2023-11-20 PROCEDURE — G8417 CALC BMI ABV UP PARAM F/U: HCPCS | Performed by: FAMILY MEDICINE

## 2023-11-20 RX ORDER — TOBRAMYCIN 3 MG/ML
1 SOLUTION/ DROPS OPHTHALMIC EVERY 4 HOURS
Qty: 5 ML | Refills: 0 | Status: SHIPPED | OUTPATIENT
Start: 2023-11-20 | End: 2023-11-30

## 2023-11-20 RX ORDER — PREDNISONE 10 MG/1
10 TABLET ORAL 2 TIMES DAILY
Qty: 10 TABLET | Refills: 0 | Status: SHIPPED | OUTPATIENT
Start: 2023-11-20 | End: 2023-11-25

## 2023-11-20 ASSESSMENT — ENCOUNTER SYMPTOMS
GASTROINTESTINAL NEGATIVE: 1
ALLERGIC/IMMUNOLOGIC NEGATIVE: 1
EYES NEGATIVE: 1
RESPIRATORY NEGATIVE: 1

## 2023-11-20 NOTE — TELEPHONE ENCOUNTER
Patient notified of echo results and Dr. Sussy Heaton recommendation. Patient had CTA Chest on 10/27/23 and has already received those results.

## 2023-11-20 NOTE — PROGRESS NOTES
Physical Exam  Vitals and nursing note reviewed. Constitutional:       Appearance: He is well-developed. HENT:      Head: Normocephalic. Right Ear: External ear normal.      Left Ear: External ear normal.      Nose: Nose normal.   Eyes:      Conjunctiva/sclera: Conjunctivae normal.      Pupils: Pupils are equal, round, and reactive to light. Cardiovascular:      Rate and Rhythm: Normal rate. Pulmonary:      Breath sounds: Normal breath sounds. Abdominal:      General: Bowel sounds are normal.      Palpations: Abdomen is soft. Musculoskeletal:         General: Normal range of motion. Cervical back: Normal range of motion and neck supple. Skin:     General: Skin is warm and dry. Neurological:      Mental Status: He is alert and oriented to person, place, and time. Psychiatric:         Behavior: Behavior normal.        Today's vitals physical examination stable. Heart is regular lungs are clear. Medications as prescribed. Reassessment with me January and sooner if problems. Plan Per Assessment:  Raul Cutler was seen today for facial swelling. Diagnoses and all orders for this visit:    Blepharitis of upper eyelids of both eyes, unspecified type    Nonrheumatic aortic valve stenosis    Other orders  -     predniSONE (DELTASONE) 10 MG tablet; Take 1 tablet by mouth 2 times daily for 5 days  -     tobramycin (TOBREX) 0.3 % ophthalmic solution; Place 1 drop into the left eye every 4 hours for 10 days            No follow-ups on file. Gabbi Sheth DO    Note was generated with the assistance of voice recognition software. Document was reviewed however may contain grammatical errors.

## 2023-11-24 ENCOUNTER — OFFICE VISIT (OUTPATIENT)
Dept: FAMILY MEDICINE CLINIC | Age: 68
End: 2023-11-24

## 2023-11-24 VITALS
OXYGEN SATURATION: 97 % | WEIGHT: 197 LBS | TEMPERATURE: 98 F | BODY MASS INDEX: 27.58 KG/M2 | DIASTOLIC BLOOD PRESSURE: 60 MMHG | HEART RATE: 60 BPM | SYSTOLIC BLOOD PRESSURE: 100 MMHG | HEIGHT: 71 IN

## 2023-11-24 DIAGNOSIS — I10 ESSENTIAL HYPERTENSION: ICD-10-CM

## 2023-11-24 DIAGNOSIS — H01.004 BLEPHARITIS OF UPPER EYELIDS OF BOTH EYES, UNSPECIFIED TYPE: Primary | ICD-10-CM

## 2023-11-24 DIAGNOSIS — J30.1 SEASONAL ALLERGIC RHINITIS DUE TO POLLEN: ICD-10-CM

## 2023-11-24 DIAGNOSIS — H01.001 BLEPHARITIS OF UPPER EYELIDS OF BOTH EYES, UNSPECIFIED TYPE: Primary | ICD-10-CM

## 2023-11-24 RX ORDER — PREDNISONE 10 MG/1
TABLET ORAL
Qty: 30 TABLET | Refills: 0 | Status: SHIPPED | OUTPATIENT
Start: 2023-11-24

## 2023-11-24 NOTE — PROGRESS NOTES
23     Julia He    : 1955 Sex: male   Age: 70 y.o. Chief Complaint   Patient presents with    Conjunctivitis    Facial Swelling       Prior to Admission medications    Medication Sig Start Date End Date Taking? Authorizing Provider   predniSONE (DELTASONE) 10 MG tablet 3 qd x 5 days then 2 qd x 5 days then 1 qd x 5 days 23  Yes Pia Wilson, DO   predniSONE (DELTASONE) 10 MG tablet Take 1 tablet by mouth 2 times daily for 5 days 23 Yes Pia Wilson,    tobramycin (TOBREX) 0.3 % ophthalmic solution Place 1 drop into the left eye every 4 hours for 10 days 23 Yes Pia Wilson, DO   tobramycin (TOBREX) 0.3 % ophthalmic solution 2 gtts qid 10/2/23  Yes Pia Wilson, DO   cetirizine (ZYRTEC) 10 MG tablet Take 1 tablet by mouth daily 10/2/23  Yes Pia Wilson DO   metoprolol succinate (TOPROL XL) 25 MG extended release tablet TAKE ONE-HALF (1/2) TABLET DAILY 22  Yes Nettie Hernandez DO   pantoprazole (PROTONIX) 40 MG tablet TAKE 1 TABLET DAILY 22  Yes Nettie Hernandez DO   terazosin (HYTRIN) 5 MG capsule TAKE 1 CAPSULE NIGHTLY 22  Yes Facundo Wilson, DO   atorvastatin (LIPITOR) 10 MG tablet TAKE 1 TABLET DAILY 22  Yes Nettie Hernandez DO   aspirin 81 MG EC tablet Take 1 tablet by mouth daily   Yes ProviderGwendolyn MD   Multiple Vitamins-Minerals (MULTIVITAMIN PO) Take  by mouth daily. Yes Provider, MD Gwendolyn          HPI: Patient evaluated today bilateral eye inflammation blepharitis persistent. We adjusted prednisone today and then he is going to finish on Tobrex drops. Persistent or worsening symptoms to call. Hypertension controlled. Review of Systems   Constitutional: Negative. HENT: Negative. Eyes: Negative. Respiratory: Negative. Gastrointestinal: Negative. Endocrine: Negative. Genitourinary: Negative. Musculoskeletal: Negative.     Skin:

## 2023-12-01 ENCOUNTER — OFFICE VISIT (OUTPATIENT)
Dept: CARDIOLOGY CLINIC | Age: 68
End: 2023-12-01

## 2023-12-01 VITALS
HEART RATE: 62 BPM | DIASTOLIC BLOOD PRESSURE: 76 MMHG | BODY MASS INDEX: 27.23 KG/M2 | SYSTOLIC BLOOD PRESSURE: 104 MMHG | HEIGHT: 71 IN | RESPIRATION RATE: 16 BRPM | WEIGHT: 194.5 LBS

## 2023-12-01 DIAGNOSIS — Q23.1 BICUSPID AORTIC VALVE: Primary | ICD-10-CM

## 2023-12-01 NOTE — PROGRESS NOTES
Hasmukh Bloom  [birth year]  Date of Service: 12/1/2023    Patient Active Problem List    Diagnosis Date Noted    Palpitations 08/22/2022     Priority: Medium    Blepharitis of upper eyelids of both eyes 11/20/2023    Skin lesion of chest wall 10/13/2023    Seasonal allergic rhinitis due to pollen 10/02/2023    Conjunctivitis 10/02/2023    Acute cough 04/14/2023    Acute bacterial sinusitis 04/14/2023    Need for pneumococcal vaccination 10/14/2019    Gastroesophageal reflux disease with esophagitis 10/14/2019    Hyperlipidemia 10/14/2019    Essential hypertension 10/14/2019    Asymptomatic microscopic hematuria 10/14/2019    Thoracic aortic aneurysm without rupture ([address]) 03/18/2019     Overview Note:     Ascending: 3.8 x 3.9 cm on CTA      Bicuspid aortic valve     Aortic stenosis      Overview Note:     Moderate       Aortic regurgitation      Overview Note:     mild to moderate         Social History     Socioeconomic History    Marital status:      Spouse name: None    Number of children: None    Years of education: None    Highest education level: None   Tobacco Use    Smoking status: Never    Smokeless tobacco: Never   Vaping Use    Vaping Use: Never used   Substance and Sexual Activity    Alcohol use: Yes     Alcohol/week: 7.0 standard drinks of alcohol     Types: 7 Glasses of wine per week    Drug use: No     Social Determinants of Health     Financial Resource Strain: Low Risk  (4/14/2023)    Overall Financial Resource Strain (CARDIA)     Difficulty of Paying Living Expenses: Not hard at all   Transportation Needs: Unknown (4/14/2023)    PRAPARE - Transportation     Lack of Transportation (Non-Medical):  No   Physical Activity: Sufficiently Active (10/7/2023)    Exercise Vital Sign     Days of Exercise per Week: 7 days     Minutes of Exercise per Session: 40 min   Housing Stability: Unknown (4/14/2023)    Housing Stability Vital Sign     Unstable Housing in the Last Year: No       Current

## 2024-01-08 RX ORDER — PANTOPRAZOLE SODIUM 40 MG/1
TABLET, DELAYED RELEASE ORAL
Qty: 90 TABLET | Refills: 3 | Status: SHIPPED | OUTPATIENT
Start: 2024-01-08

## 2024-01-08 RX ORDER — TERAZOSIN 5 MG/1
CAPSULE ORAL
Qty: 90 CAPSULE | Refills: 3 | Status: SHIPPED | OUTPATIENT
Start: 2024-01-08

## 2024-01-08 RX ORDER — ATORVASTATIN CALCIUM 10 MG/1
TABLET, FILM COATED ORAL
Qty: 90 TABLET | Refills: 3 | Status: SHIPPED | OUTPATIENT
Start: 2024-01-08

## 2024-01-26 ENCOUNTER — OFFICE VISIT (OUTPATIENT)
Dept: PRIMARY CARE CLINIC | Age: 69
End: 2024-01-26
Payer: MEDICARE

## 2024-01-26 VITALS
OXYGEN SATURATION: 96 % | SYSTOLIC BLOOD PRESSURE: 100 MMHG | HEART RATE: 58 BPM | WEIGHT: 199 LBS | HEIGHT: 71 IN | DIASTOLIC BLOOD PRESSURE: 60 MMHG | BODY MASS INDEX: 27.86 KG/M2 | TEMPERATURE: 98.4 F

## 2024-01-26 DIAGNOSIS — I10 ESSENTIAL HYPERTENSION: Primary | ICD-10-CM

## 2024-01-26 DIAGNOSIS — T78.40XD ALLERGY, SUBSEQUENT ENCOUNTER: ICD-10-CM

## 2024-01-26 DIAGNOSIS — E78.00 PURE HYPERCHOLESTEROLEMIA: ICD-10-CM

## 2024-01-26 DIAGNOSIS — Q23.1 BICUSPID AORTIC VALVE: ICD-10-CM

## 2024-01-26 PROBLEM — T78.40XA ALLERGIES: Status: ACTIVE | Noted: 2024-01-26

## 2024-01-26 PROCEDURE — 3074F SYST BP LT 130 MM HG: CPT | Performed by: FAMILY MEDICINE

## 2024-01-26 PROCEDURE — G8427 DOCREV CUR MEDS BY ELIG CLIN: HCPCS | Performed by: FAMILY MEDICINE

## 2024-01-26 PROCEDURE — 3017F COLORECTAL CA SCREEN DOC REV: CPT | Performed by: FAMILY MEDICINE

## 2024-01-26 PROCEDURE — 1036F TOBACCO NON-USER: CPT | Performed by: FAMILY MEDICINE

## 2024-01-26 PROCEDURE — 3078F DIAST BP <80 MM HG: CPT | Performed by: FAMILY MEDICINE

## 2024-01-26 PROCEDURE — G8484 FLU IMMUNIZE NO ADMIN: HCPCS | Performed by: FAMILY MEDICINE

## 2024-01-26 PROCEDURE — G8417 CALC BMI ABV UP PARAM F/U: HCPCS | Performed by: FAMILY MEDICINE

## 2024-01-26 PROCEDURE — 99214 OFFICE O/P EST MOD 30 MIN: CPT | Performed by: FAMILY MEDICINE

## 2024-01-26 PROCEDURE — 1123F ACP DISCUSS/DSCN MKR DOCD: CPT | Performed by: FAMILY MEDICINE

## 2024-01-26 RX ORDER — AZITHROMYCIN 250 MG/1
TABLET, FILM COATED ORAL
Qty: 1 PACKET | Refills: 1 | Status: SHIPPED | OUTPATIENT
Start: 2024-01-26

## 2024-01-26 ASSESSMENT — ENCOUNTER SYMPTOMS
GASTROINTESTINAL NEGATIVE: 1
EYES NEGATIVE: 1
ALLERGIC/IMMUNOLOGIC NEGATIVE: 1
RESPIRATORY NEGATIVE: 1

## 2024-01-26 NOTE — PROGRESS NOTES
24     Robin Ricci    : 1955 Sex: male   Age: 68 y.o.      Chief Complaint   Patient presents with    Allergies    Hyperlipidemia       Prior to Admission medications    Medication Sig Start Date End Date Taking? Authorizing Provider   terazosin (HYTRIN) 5 MG capsule TAKE 1 CAPSULE NIGHTLY 24  Yes Facundo Wilson DO   atorvastatin (LIPITOR) 10 MG tablet TAKE 1 TABLET DAILY 24  Yes Facundo Wilson DO   pantoprazole (PROTONIX) 40 MG tablet TAKE 1 TABLET DAILY 24  Yes Facundo Wilson DO   tobramycin (TOBREX) 0.3 % ophthalmic solution 2 gtts qid 10/2/23  Yes Facundo Wilson DO   metoprolol succinate (TOPROL XL) 25 MG extended release tablet TAKE ONE-HALF (1/2) TABLET DAILY 22  Yes Facundo Wilson DO   aspirin 81 MG EC tablet Take 1 tablet by mouth daily   Yes ProviderGwendolyn MD   Multiple Vitamins-Minerals (MULTIVITAMIN PO) Take  by mouth daily.   Yes ProviderGwendolyn MD          HPI: Carter seen today problems with hypertension hyperlipidemia bicuspid valve allergies.  Currently receiving allergy injections weekly and has been doing well.  No further problems with eye inflammation.  History of allergies to strawberries as well as pet dander.  All medications reviewed maintain as prescribed.  History of respiratory congestion and we will send in a Z-Elio and then notify me if any problems.  April visit with lab studies.          Review of Systems   Constitutional: Negative.    HENT: Negative.     Eyes: Negative.    Respiratory: Negative.     Gastrointestinal: Negative.    Endocrine: Negative.    Genitourinary: Negative.    Musculoskeletal: Negative.    Skin: Negative.    Allergic/Immunologic: Negative.    Neurological: Negative.    Hematological: Negative.    Psychiatric/Behavioral: Negative.        Today systems review stable meds as prescribed.        Current Outpatient Medications:     terazosin (HYTRIN) 5 MG capsule, TAKE 1 CAPSULE NIGHTLY, Disp: 90

## 2024-06-10 RX ORDER — METOPROLOL SUCCINATE 25 MG/1
TABLET, EXTENDED RELEASE ORAL
Qty: 45 TABLET | Refills: 3 | OUTPATIENT
Start: 2024-06-10

## 2024-06-17 RX ORDER — METOPROLOL SUCCINATE 25 MG/1
TABLET, EXTENDED RELEASE ORAL
Qty: 45 TABLET | Refills: 3 | OUTPATIENT
Start: 2024-06-17

## 2024-06-25 RX ORDER — METOPROLOL SUCCINATE 25 MG/1
TABLET, EXTENDED RELEASE ORAL
Qty: 45 TABLET | Refills: 0 | Status: SHIPPED | OUTPATIENT
Start: 2024-06-25

## 2024-06-25 NOTE — TELEPHONE ENCOUNTER
----- Message from Robin Ricci sent at 6/24/2024  6:01 PM EDT -----  Regarding: Prescription order  Contact: [phone]  Sunshine,  Please ask [place] if it is OK to send the order in for the Metoprolol.  I will be out of town Jul and until Aug 15th.  I will make an appointment for as soon as I get back.  Ask him if it is ok for the Z Pac.  I try to have one on hand when I am overseas.  Thanks

## 2024-06-25 NOTE — TELEPHONE ENCOUNTER
Pt advised he was due for appt and labs in April. He states he will schedule when he gets back in town in Aug. He is also asking for a script of zpak to keep on hand

## 2024-08-16 ENCOUNTER — OFFICE VISIT (OUTPATIENT)
Dept: FAMILY MEDICINE CLINIC | Age: 69
End: 2024-08-16
Payer: MEDICARE

## 2024-08-16 VITALS
HEART RATE: 74 BPM | HEIGHT: 71 IN | OXYGEN SATURATION: 98 % | WEIGHT: 200 LBS | DIASTOLIC BLOOD PRESSURE: 70 MMHG | BODY MASS INDEX: 28 KG/M2 | TEMPERATURE: 98.2 F | SYSTOLIC BLOOD PRESSURE: 110 MMHG

## 2024-08-16 DIAGNOSIS — U07.1 COVID-19: ICD-10-CM

## 2024-08-16 DIAGNOSIS — R05.1 ACUTE COUGH: Primary | ICD-10-CM

## 2024-08-16 DIAGNOSIS — I35.0 NONRHEUMATIC AORTIC VALVE STENOSIS: ICD-10-CM

## 2024-08-16 DIAGNOSIS — I10 ESSENTIAL HYPERTENSION: ICD-10-CM

## 2024-08-16 LAB
Lab: ABNORMAL
PERFORMING INSTRUMENT: ABNORMAL
QC PASS/FAIL: ABNORMAL
SARS-COV-2, POC: DETECTED

## 2024-08-16 PROCEDURE — 3074F SYST BP LT 130 MM HG: CPT | Performed by: FAMILY MEDICINE

## 2024-08-16 PROCEDURE — 1123F ACP DISCUSS/DSCN MKR DOCD: CPT | Performed by: FAMILY MEDICINE

## 2024-08-16 PROCEDURE — 3078F DIAST BP <80 MM HG: CPT | Performed by: FAMILY MEDICINE

## 2024-08-16 PROCEDURE — 87426 SARSCOV CORONAVIRUS AG IA: CPT | Performed by: FAMILY MEDICINE

## 2024-08-16 PROCEDURE — 99214 OFFICE O/P EST MOD 30 MIN: CPT | Performed by: FAMILY MEDICINE

## 2024-08-16 PROCEDURE — G8417 CALC BMI ABV UP PARAM F/U: HCPCS | Performed by: FAMILY MEDICINE

## 2024-08-16 PROCEDURE — 1036F TOBACCO NON-USER: CPT | Performed by: FAMILY MEDICINE

## 2024-08-16 PROCEDURE — G8427 DOCREV CUR MEDS BY ELIG CLIN: HCPCS | Performed by: FAMILY MEDICINE

## 2024-08-16 PROCEDURE — 3017F COLORECTAL CA SCREEN DOC REV: CPT | Performed by: FAMILY MEDICINE

## 2024-08-16 ASSESSMENT — ENCOUNTER SYMPTOMS
EYES NEGATIVE: 1
RESPIRATORY NEGATIVE: 1
ALLERGIC/IMMUNOLOGIC NEGATIVE: 1
GASTROINTESTINAL NEGATIVE: 1

## 2024-08-16 NOTE — PROGRESS NOTES
24     Robin Ricci    : 1955 Sex: male   Age: 69 y.o.      Chief Complaint   Patient presents with    Congestion    Cough       Prior to Admission medications    Medication Sig Start Date End Date Taking? Authorizing Provider   metoprolol succinate (TOPROL XL) 25 MG extended release tablet TAKE ONE-HALF (1/2) TABLET DAILY 24  Yes Facundo iWlson DO   terazosin (HYTRIN) 5 MG capsule TAKE 1 CAPSULE NIGHTLY 24  Yes Facundo Wilson DO   atorvastatin (LIPITOR) 10 MG tablet TAKE 1 TABLET DAILY 24  Yes Facundo Wilson DO   pantoprazole (PROTONIX) 40 MG tablet TAKE 1 TABLET DAILY 24  Yes Facundo Wilson,    tobramycin (TOBREX) 0.3 % ophthalmic solution 2 gtts qid 10/2/23  Yes Facundo Wilson DO   aspirin 81 MG EC tablet Take 1 tablet by mouth daily   Yes Provider, MD Gwendolyn   Multiple Vitamins-Minerals (MULTIVITAMIN PO) Take  by mouth daily.   Yes Provider, MD Gwendolyn          HPI:           Review of Systems   Constitutional: Negative.    HENT: Negative.     Eyes: Negative.    Respiratory: Negative.     Gastrointestinal: Negative.    Endocrine: Negative.    Genitourinary: Negative.    Musculoskeletal: Negative.    Skin: Negative.    Allergic/Immunologic: Negative.    Neurological: Negative.    Hematological: Negative.    Psychiatric/Behavioral: Negative.                Current Outpatient Medications:     metoprolol succinate (TOPROL XL) 25 MG extended release tablet, TAKE ONE-HALF (1/2) TABLET DAILY, Disp: 45 tablet, Rfl: 0    terazosin (HYTRIN) 5 MG capsule, TAKE 1 CAPSULE NIGHTLY, Disp: 90 capsule, Rfl: 3    atorvastatin (LIPITOR) 10 MG tablet, TAKE 1 TABLET DAILY, Disp: 90 tablet, Rfl: 3    pantoprazole (PROTONIX) 40 MG tablet, TAKE 1 TABLET DAILY, Disp: 90 tablet, Rfl: 3    tobramycin (TOBREX) 0.3 % ophthalmic solution, 2 gtts qid, Disp: 5 mL, Rfl: 0    aspirin 81 MG EC tablet, Take 1 tablet by mouth daily, Disp: , Rfl:     Multiple Vitamins-Minerals

## 2024-08-16 NOTE — PROGRESS NOTES
24     Robin Ricci    : 1955 Sex: male   Age: 69 y.o.      Chief Complaint   Patient presents with    Congestion    Cough       Prior to Admission medications    Medication Sig Start Date End Date Taking? Authorizing Provider   nirmatrelvir/ritonavir 300/100 (PAXLOVID) 20 x 150 MG & 10 x 100MG TBPK Take 3 tablets (two 150 mg nirmatrelvir and one 100 mg ritonavir tablets) by mouth every 12 hours for 5 days. 24 Yes Facundo Wilson DO   metoprolol succinate (TOPROL XL) 25 MG extended release tablet TAKE ONE-HALF (1/2) TABLET DAILY 24  Yes Facundo Wilson DO   terazosin (HYTRIN) 5 MG capsule TAKE 1 CAPSULE NIGHTLY 24  Yes Facundo Wilson DO   atorvastatin (LIPITOR) 10 MG tablet TAKE 1 TABLET DAILY 24  Yes Facundo Wilson DO   pantoprazole (PROTONIX) 40 MG tablet TAKE 1 TABLET DAILY 24  Yes Facundo Wilson DO   tobramycin (TOBREX) 0.3 % ophthalmic solution 2 gtts qid 10/2/23  Yes Facundo Wilson DO   aspirin 81 MG EC tablet Take 1 tablet by mouth daily   Yes ProviderGwendolyn MD   Multiple Vitamins-Minerals (MULTIVITAMIN PO) Take  by mouth daily.   Yes ProviderGwendolyn MD          HPI: Patient seen this morning issues of cough and congestion and some bodyaches.  Recent travel to [facility] and currently positive for COVID virus.  We will treat with Paxlovid and then reassess if persistent or worsening symptoms.  Respiratory status is currently stable.  Pulse ox stable.  Medically otherwise doing well and meds to continue as prescribed.          Review of Systems   Constitutional: Negative.    HENT: Negative.     Eyes: Negative.    Respiratory: Negative.     Gastrointestinal: Negative.    Endocrine: Negative.    Genitourinary: Negative.    Musculoskeletal: Negative.    Skin: Negative.    Allergic/Immunologic: Negative.    Neurological: Negative.    Hematological: Negative.    Psychiatric/Behavioral: Negative.     Today systems review stable

## 2024-08-23 ENCOUNTER — TELEPHONE (OUTPATIENT)
Dept: CARDIOLOGY CLINIC | Age: 69
End: 2024-08-23

## 2024-08-23 DIAGNOSIS — I35.0 AORTIC VALVE STENOSIS, ETIOLOGY OF CARDIAC VALVE DISEASE UNSPECIFIED: Primary | ICD-10-CM

## 2024-08-23 DIAGNOSIS — I35.1 AORTIC VALVE INSUFFICIENCY, ETIOLOGY OF CARDIAC VALVE DISEASE UNSPECIFIED: ICD-10-CM

## 2024-08-23 NOTE — TELEPHONE ENCOUNTER
Patient notified of Dr. Casillas's recommendations. F/U scheduled for 11/25/24 at 8:10 a.m. Order placed for echo.

## 2024-08-23 NOTE — TELEPHONE ENCOUNTER
His echo was 11-23 so he is not due until November but it would be okay to order both the echo and follow-up to be done in a couple of months (October/November).  Have the echo done a week or 2 before his follow-up.

## 2024-08-30 DIAGNOSIS — E78.00 PURE HYPERCHOLESTEROLEMIA: ICD-10-CM

## 2024-08-30 DIAGNOSIS — I10 ESSENTIAL HYPERTENSION: ICD-10-CM

## 2024-08-30 LAB
ALBUMIN: 4.3 G/DL (ref 3.5–5.2)
ALP BLD-CCNC: 106 U/L (ref 40–129)
ALT SERPL-CCNC: 15 U/L (ref 0–40)
ANION GAP SERPL CALCULATED.3IONS-SCNC: 9 MMOL/L (ref 7–16)
AST SERPL-CCNC: 23 U/L (ref 0–39)
BILIRUB SERPL-MCNC: 0.7 MG/DL (ref 0–1.2)
BUN BLDV-MCNC: 16 MG/DL (ref 6–23)
CALCIUM SERPL-MCNC: 9.5 MG/DL (ref 8.6–10.2)
CHLORIDE BLD-SCNC: 104 MMOL/L (ref 98–107)
CHOLESTEROL, TOTAL: 177 MG/DL
CO2: 27 MMOL/L (ref 22–29)
CREAT SERPL-MCNC: 1.1 MG/DL (ref 0.7–1.2)
GFR, ESTIMATED: 71 ML/MIN/1.73M2
GLUCOSE BLD-MCNC: 95 MG/DL (ref 74–99)
HDLC SERPL-MCNC: 42 MG/DL
LDL CHOLESTEROL: 109 MG/DL
POTASSIUM SERPL-SCNC: 4.7 MMOL/L (ref 3.5–5)
SODIUM BLD-SCNC: 140 MMOL/L (ref 132–146)
TOTAL CK: 87 U/L (ref 20–200)
TOTAL PROTEIN: 7 G/DL (ref 6.4–8.3)
TRIGL SERPL-MCNC: 128 MG/DL
VLDLC SERPL CALC-MCNC: 26 MG/DL

## 2024-09-06 ENCOUNTER — OFFICE VISIT (OUTPATIENT)
Dept: PRIMARY CARE CLINIC | Age: 69
End: 2024-09-06
Payer: MEDICARE

## 2024-09-06 VITALS
HEART RATE: 80 BPM | OXYGEN SATURATION: 98 % | BODY MASS INDEX: 28 KG/M2 | WEIGHT: 200 LBS | TEMPERATURE: 98 F | DIASTOLIC BLOOD PRESSURE: 60 MMHG | SYSTOLIC BLOOD PRESSURE: 118 MMHG | HEIGHT: 71 IN

## 2024-09-06 DIAGNOSIS — Z00.00 ANNUAL PHYSICAL EXAM: Primary | ICD-10-CM

## 2024-09-06 DIAGNOSIS — I10 ESSENTIAL HYPERTENSION: ICD-10-CM

## 2024-09-06 DIAGNOSIS — R35.0 URINARY FREQUENCY: ICD-10-CM

## 2024-09-06 DIAGNOSIS — E78.00 PURE HYPERCHOLESTEROLEMIA: ICD-10-CM

## 2024-09-06 DIAGNOSIS — Z12.5 PROSTATE CANCER SCREENING: ICD-10-CM

## 2024-09-06 DIAGNOSIS — Z23 INFLUENZA VACCINE NEEDED: ICD-10-CM

## 2024-09-06 LAB
BILIRUBIN, POC: NEGATIVE
BLOOD URINE, POC: NEGATIVE
CLARITY, POC: ABNORMAL
COLOR, POC: YELLOW
GLUCOSE URINE, POC: NEGATIVE MG/DL
KETONES, POC: ABNORMAL MG/DL
LEUKOCYTE EST, POC: NEGATIVE
NITRITE, POC: NEGATIVE
PH, POC: 7
PROTEIN, POC: ABNORMAL MG/DL
SPECIFIC GRAVITY, POC: 1.02
UROBILINOGEN, POC: 0.2 MG/DL

## 2024-09-06 PROCEDURE — 99214 OFFICE O/P EST MOD 30 MIN: CPT | Performed by: FAMILY MEDICINE

## 2024-09-06 PROCEDURE — 81002 URINALYSIS NONAUTO W/O SCOPE: CPT | Performed by: FAMILY MEDICINE

## 2024-09-06 PROCEDURE — 90653 IIV ADJUVANT VACCINE IM: CPT | Performed by: FAMILY MEDICINE

## 2024-09-06 PROCEDURE — 93000 ELECTROCARDIOGRAM COMPLETE: CPT | Performed by: FAMILY MEDICINE

## 2024-09-06 PROCEDURE — G0008 ADMIN INFLUENZA VIRUS VAC: HCPCS | Performed by: FAMILY MEDICINE

## 2024-09-06 RX ORDER — PANTOPRAZOLE SODIUM 40 MG/1
TABLET, DELAYED RELEASE ORAL
Qty: 90 TABLET | Refills: 3 | Status: SHIPPED | OUTPATIENT
Start: 2024-09-06

## 2024-09-06 RX ORDER — METOPROLOL SUCCINATE 25 MG/1
TABLET, EXTENDED RELEASE ORAL
Qty: 45 TABLET | Refills: 3 | Status: SHIPPED | OUTPATIENT
Start: 2024-09-06

## 2024-09-06 RX ORDER — TERAZOSIN 5 MG/1
CAPSULE ORAL
Qty: 90 CAPSULE | Refills: 3 | Status: SHIPPED | OUTPATIENT
Start: 2024-09-06

## 2024-09-06 RX ORDER — ATORVASTATIN CALCIUM 10 MG/1
TABLET, FILM COATED ORAL
Qty: 90 TABLET | Refills: 3 | Status: SHIPPED | OUTPATIENT
Start: 2024-09-06

## 2024-09-06 ASSESSMENT — PATIENT HEALTH QUESTIONNAIRE - PHQ9
SUM OF ALL RESPONSES TO PHQ QUESTIONS 1-9: 0
2. FEELING DOWN, DEPRESSED OR HOPELESS: NOT AT ALL
SUM OF ALL RESPONSES TO PHQ QUESTIONS 1-9: 0
1. LITTLE INTEREST OR PLEASURE IN DOING THINGS: NOT AT ALL
SUM OF ALL RESPONSES TO PHQ QUESTIONS 1-9: 0
SUM OF ALL RESPONSES TO PHQ9 QUESTIONS 1 & 2: 0
SUM OF ALL RESPONSES TO PHQ QUESTIONS 1-9: 0

## 2024-10-06 PROBLEM — Z00.00 ANNUAL PHYSICAL EXAM: Status: RESOLVED | Noted: 2020-11-02 | Resolved: 2024-10-06

## 2024-10-06 PROBLEM — Z12.5 PROSTATE CANCER SCREENING: Status: RESOLVED | Noted: 2024-09-06 | Resolved: 2024-10-06

## 2024-11-04 DIAGNOSIS — Z12.5 PROSTATE CANCER SCREENING: ICD-10-CM

## 2024-11-04 DIAGNOSIS — I10 ESSENTIAL HYPERTENSION: ICD-10-CM

## 2024-11-04 LAB
BASOPHILS ABSOLUTE: 0.05 K/UL (ref 0–0.2)
BASOPHILS RELATIVE PERCENT: 1 % (ref 0–2)
EOSINOPHILS ABSOLUTE: 0.11 K/UL (ref 0.05–0.5)
EOSINOPHILS RELATIVE PERCENT: 2 % (ref 0–6)
HCT VFR BLD CALC: 43.7 % (ref 37–54)
HEMOGLOBIN: 14.1 G/DL (ref 12.5–16.5)
IMMATURE GRANULOCYTES %: 0 % (ref 0–5)
IMMATURE GRANULOCYTES ABSOLUTE: <0.03 K/UL (ref 0–0.58)
LYMPHOCYTES ABSOLUTE: 1.19 K/UL (ref 1.5–4)
LYMPHOCYTES RELATIVE PERCENT: 26 % (ref 20–42)
MCH RBC QN AUTO: 31.8 PG (ref 26–35)
MCHC RBC AUTO-ENTMCNC: 32.3 G/DL (ref 32–34.5)
MCV RBC AUTO: 98.4 FL (ref 80–99.9)
MONOCYTES ABSOLUTE: 0.55 K/UL (ref 0.1–0.95)
MONOCYTES RELATIVE PERCENT: 12 % (ref 2–12)
NEUTROPHILS ABSOLUTE: 2.67 K/UL (ref 1.8–7.3)
NEUTROPHILS RELATIVE PERCENT: 58 % (ref 43–80)
PDW BLD-RTO: 12 % (ref 11.5–15)
PLATELET # BLD: 168 K/UL (ref 130–450)
PMV BLD AUTO: 11.7 FL (ref 7–12)
PROSTATE SPECIFIC ANTIGEN: 0.48 NG/ML (ref 0–4)
RBC # BLD: 4.44 M/UL (ref 3.8–5.8)
THYROXINE (T4): 7 UG/DL (ref 4.5–11.7)
TSH SERPL DL<=0.05 MIU/L-ACNC: 1.82 UIU/ML (ref 0.27–4.2)
WBC # BLD: 4.6 K/UL (ref 4.5–11.5)

## 2024-11-11 ENCOUNTER — HOSPITAL ENCOUNTER (OUTPATIENT)
Dept: CARDIOLOGY | Age: 69
Discharge: HOME | End: 2024-11-13
Attending: INTERNAL MEDICINE
Payer: MEDICARE

## 2024-11-11 VITALS
DIASTOLIC BLOOD PRESSURE: 60 MMHG | HEIGHT: 71 IN | SYSTOLIC BLOOD PRESSURE: 118 MMHG | BODY MASS INDEX: 28 KG/M2 | WEIGHT: 200 LBS

## 2024-11-11 DIAGNOSIS — I35.1 AORTIC VALVE INSUFFICIENCY, ETIOLOGY OF CARDIAC VALVE DISEASE UNSPECIFIED: ICD-10-CM

## 2024-11-11 DIAGNOSIS — I35.0 AORTIC VALVE STENOSIS, ETIOLOGY OF CARDIAC VALVE DISEASE UNSPECIFIED: ICD-10-CM

## 2024-11-11 PROCEDURE — 93306 TTE W/DOPPLER COMPLETE: CPT

## 2024-11-13 LAB
ECHO AO ASC DIAM: 3.5 CM
ECHO AO ASCENDING AORTA INDEX: 1.66 CM/M2
ECHO AV AREA PEAK VELOCITY: 0.8 CM2
ECHO AV AREA VTI: 0.9 CM2
ECHO AV AREA/BSA PEAK VELOCITY: 0.4 CM2/M2
ECHO AV AREA/BSA VTI: 0.4 CM2/M2
ECHO AV CUSP MM: 1.2 CM
ECHO AV MEAN GRADIENT: 31 MMHG
ECHO AV MEAN VELOCITY: 2.7 M/S
ECHO AV PEAK GRADIENT: 52 MMHG
ECHO AV PEAK VELOCITY: 3.6 M/S
ECHO AV VELOCITY RATIO: 0.17
ECHO AV VTI: 94 CM
ECHO BSA: 2.13 M2
ECHO EST RA PRESSURE: 3 MMHG
ECHO LA DIAMETER INDEX: 1.33 CM/M2
ECHO LA DIAMETER: 2.8 CM
ECHO LA VOL A-L A2C: 63 ML (ref 18–58)
ECHO LA VOL A-L A4C: 48 ML (ref 18–58)
ECHO LA VOL MOD A2C: 62 ML (ref 18–58)
ECHO LA VOL MOD A4C: 41 ML (ref 18–58)
ECHO LA VOLUME AREA LENGTH: 56 ML
ECHO LA VOLUME INDEX A-L A2C: 30 ML/M2 (ref 16–34)
ECHO LA VOLUME INDEX A-L A4C: 23 ML/M2 (ref 16–34)
ECHO LA VOLUME INDEX AREA LENGTH: 27 ML/M2 (ref 16–34)
ECHO LA VOLUME INDEX MOD A2C: 29 ML/M2 (ref 16–34)
ECHO LA VOLUME INDEX MOD A4C: 19 ML/M2 (ref 16–34)
ECHO LV EF PHYSICIAN: 50 %
ECHO LV FRACTIONAL SHORTENING: 27 % (ref 28–44)
ECHO LV INTERNAL DIMENSION DIASTOLE INDEX: 2.27 CM/M2
ECHO LV INTERNAL DIMENSION DIASTOLIC: 4.8 CM (ref 4.2–5.9)
ECHO LV INTERNAL DIMENSION SYSTOLIC INDEX: 1.66 CM/M2
ECHO LV INTERNAL DIMENSION SYSTOLIC: 3.5 CM
ECHO LV ISOVOLUMETRIC RELAXATION TIME (IVRT): 101.5 MS
ECHO LV IVSD: 1.3 CM (ref 0.6–1)
ECHO LV MASS 2D: 245.7 G (ref 88–224)
ECHO LV MASS INDEX 2D: 116.5 G/M2 (ref 49–115)
ECHO LV POSTERIOR WALL DIASTOLIC: 1.3 CM (ref 0.6–1)
ECHO LV RELATIVE WALL THICKNESS RATIO: 0.54
ECHO LVOT AREA: 4.5 CM2
ECHO LVOT AV VTI INDEX: 0.19
ECHO LVOT DIAM: 2.4 CM
ECHO LVOT MEAN GRADIENT: 1 MMHG
ECHO LVOT PEAK GRADIENT: 2 MMHG
ECHO LVOT PEAK VELOCITY: 0.6 M/S
ECHO LVOT STROKE VOLUME INDEX: 38.1 ML/M2
ECHO LVOT SV: 80.5 ML
ECHO LVOT VTI: 17.8 CM
ECHO MV "A" WAVE DURATION: 138.4 MSEC
ECHO MV A VELOCITY: 0.47 M/S
ECHO MV AREA PHT: 1.6 CM2
ECHO MV AREA VTI: 3 CM2
ECHO MV E DECELERATION TIME (DT): 212.3 MS
ECHO MV E VELOCITY: 0.83 M/S
ECHO MV E/A RATIO: 1.77
ECHO MV LVOT VTI INDEX: 1.5
ECHO MV MAX VELOCITY: 0.8 M/S
ECHO MV MEAN GRADIENT: 1 MMHG
ECHO MV MEAN VELOCITY: 0.4 M/S
ECHO MV PEAK GRADIENT: 3 MMHG
ECHO MV PRESSURE HALF TIME (PHT): 138.3 MS
ECHO MV VTI: 26.7 CM
ECHO PULMONARY ARTERY END DIASTOLIC PRESSURE: 5 MMHG
ECHO PV MAX VELOCITY: 1 M/S
ECHO PV MEAN GRADIENT: 2 MMHG
ECHO PV MEAN VELOCITY: 0.7 M/S
ECHO PV PEAK GRADIENT: 4 MMHG
ECHO PV REGURGITANT MAX VELOCITY: 1.1 M/S
ECHO PV VTI: 24.1 CM
ECHO RIGHT VENTRICULAR SYSTOLIC PRESSURE (RVSP): 39 MMHG
ECHO RV INTERNAL DIMENSION: 3.1 CM
ECHO TV REGURGITANT MAX VELOCITY: 3.02 M/S
ECHO TV REGURGITANT PEAK GRADIENT: 37 MMHG

## 2024-11-15 ENCOUNTER — TELEPHONE (OUTPATIENT)
Dept: CARDIOLOGY CLINIC | Age: 69
End: 2024-11-15

## 2024-11-15 NOTE — TELEPHONE ENCOUNTER
Patient notified of echo results and Dr. Casillas's recommendation. Patient is already scheduled for Monday, 11/25/24.

## 2024-11-15 NOTE — TELEPHONE ENCOUNTER
----- Message from Dr. Marco Antonio Casillas, DO sent at 11/14/2024  7:28 PM EST -----  Patient also had a CTA of his thoracic aorta.  The aortic stenosis is becoming more.  He should be due for a follow-up office appointment we should evaluate and discuss this.  If not, have him come in next available.  Should be seen within a month.

## 2024-11-25 ENCOUNTER — OFFICE VISIT (OUTPATIENT)
Dept: CARDIOLOGY CLINIC | Age: 69
End: 2024-11-25

## 2024-11-25 VITALS
HEART RATE: 70 BPM | DIASTOLIC BLOOD PRESSURE: 64 MMHG | RESPIRATION RATE: 18 BRPM | BODY MASS INDEX: 28.1 KG/M2 | TEMPERATURE: 97.5 F | HEIGHT: 71 IN | OXYGEN SATURATION: 98 % | WEIGHT: 200.7 LBS | SYSTOLIC BLOOD PRESSURE: 118 MMHG

## 2024-11-25 DIAGNOSIS — I71.20 THORACIC AORTIC ANEURYSM (TAA), UNSPECIFIED PART, UNSPECIFIED WHETHER RUPTURED (HCC): ICD-10-CM

## 2024-11-25 DIAGNOSIS — Q23.81 BICUSPID AORTIC VALVE: Primary | ICD-10-CM

## 2024-11-25 NOTE — PROGRESS NOTES
Robin Ricci  1955  Date of Service: 11/25/2024    Patient Active Problem List    Diagnosis Date Noted    Palpitations 08/22/2022     Priority: Medium    Urinary frequency 09/06/2024    COVID-19 08/16/2024    Allergies 01/26/2024    Blepharitis of upper eyelids of both eyes 11/20/2023    Skin lesion of chest wall 10/13/2023    Seasonal allergic rhinitis due to pollen 10/02/2023    Conjunctivitis 10/02/2023    Acute cough 04/14/2023    Acute bacterial sinusitis 04/14/2023    Need for pneumococcal vaccination 10/14/2019    Gastroesophageal reflux disease with esophagitis 10/14/2019    Hyperlipidemia 10/14/2019    Essential hypertension 10/14/2019    Asymptomatic microscopic hematuria 10/14/2019    Thoracic aortic aneurysm without rupture ([facility]) 03/18/2019     Overview Note:     Ascending: 3.8 x 3.9 cm on CTA      Bicuspid aortic valve     Aortic stenosis      Overview Note:     Moderate       Aortic regurgitation      Overview Note:     mild to moderate         Social History     Socioeconomic History    Marital status:      Spouse name: None    Number of children: None    Years of education: None    Highest education level: None   Tobacco Use    Smoking status: Never    Smokeless tobacco: Never   Vaping Use    Vaping status: Never Used   Substance and Sexual Activity    Alcohol use: Yes     Alcohol/week: 7.0 standard drinks of alcohol     Types: 7 Glasses of wine per week    Drug use: No     Social Determinants of Health     Financial Resource Strain: Low Risk  (4/14/2023)    Overall Financial Resource Strain (CARDIA)     Difficulty of Paying Living Expenses: Not hard at all   Transportation Needs: Unknown (4/14/2023)    PRAPARE - Transportation     Lack of Transportation (Non-Medical): No   Physical Activity: Sufficiently Active (10/7/2023)    Exercise Vital Sign     Days of Exercise per Week: 7 days     Minutes of Exercise per Session: 40 min   Housing Stability: Unknown (4/14/2023)    Housing

## 2024-11-26 ENCOUNTER — TELEPHONE (OUTPATIENT)
Dept: CARDIOLOGY CLINIC | Age: 69
End: 2024-11-26

## 2024-11-26 DIAGNOSIS — I71.20 THORACIC AORTIC ANEURYSM (TAA), UNSPECIFIED PART, UNSPECIFIED WHETHER RUPTURED (HCC): Primary | ICD-10-CM

## 2024-11-26 NOTE — TELEPHONE ENCOUNTER
Dr Casillas ordering  Chest CTA #11673  DX: TAA I71.20  Not scheduled  [facility]

## 2024-12-23 ENCOUNTER — TELEPHONE (OUTPATIENT)
Dept: PRIMARY CARE CLINIC | Age: 69
End: 2024-12-23

## 2024-12-23 NOTE — TELEPHONE ENCOUNTER
Patient needing hospital follow up/ post op appointment from open heart surgery. Would like to be seen either end of week or beginning of next. Can you advise where to schedule him at

## 2024-12-27 ENCOUNTER — OFFICE VISIT (OUTPATIENT)
Dept: PRIMARY CARE CLINIC | Age: 69
End: 2024-12-27

## 2024-12-27 VITALS
BODY MASS INDEX: 27.16 KG/M2 | HEART RATE: 70 BPM | TEMPERATURE: 98 F | HEIGHT: 71 IN | SYSTOLIC BLOOD PRESSURE: 120 MMHG | OXYGEN SATURATION: 98 % | WEIGHT: 194 LBS | DIASTOLIC BLOOD PRESSURE: 70 MMHG

## 2024-12-27 DIAGNOSIS — Z09 ENCOUNTER FOR FOLLOW-UP FOR AORTIC VALVE REPLACEMENT: Primary | ICD-10-CM

## 2024-12-27 DIAGNOSIS — Z95.2 ENCOUNTER FOR FOLLOW-UP FOR AORTIC VALVE REPLACEMENT: Primary | ICD-10-CM

## 2024-12-27 DIAGNOSIS — E78.00 PURE HYPERCHOLESTEROLEMIA: ICD-10-CM

## 2024-12-27 DIAGNOSIS — K21.00 GASTROESOPHAGEAL REFLUX DISEASE WITH ESOPHAGITIS WITHOUT HEMORRHAGE: ICD-10-CM

## 2024-12-27 DIAGNOSIS — I10 ESSENTIAL HYPERTENSION: ICD-10-CM

## 2024-12-27 ASSESSMENT — ENCOUNTER SYMPTOMS
EYES NEGATIVE: 1
ALLERGIC/IMMUNOLOGIC NEGATIVE: 1
RESPIRATORY NEGATIVE: 1
GASTROINTESTINAL NEGATIVE: 1

## 2024-12-27 NOTE — PROGRESS NOTES
24     Robin Ricci    : 1955 Sex: male   Age: 69 y.o.      Chief Complaint   Patient presents with    Follow-Up from Hospital              Prior to Admission medications    Medication Sig Start Date End Date Taking? Authorizing Provider   atorvastatin (LIPITOR) 10 MG tablet TAKE 1 TABLET DAILY 24   Facundo Wilosn DO   pantoprazole (PROTONIX) 40 MG tablet TAKE 1 TABLET DAILY 24   Facundo Wilson DO   terazosin (HYTRIN) 5 MG capsule TAKE 1 CAPSULE NIGHTLY 24   Facundo Wilson DO   metoprolol succinate (TOPROL XL) 25 MG extended release tablet TAKE ONE-HALF (1/2) TABLET DAILY 24   Facundo Wilson DO   tobramycin (TOBREX) 0.3 % ophthalmic solution 2 gtts qid 10/2/23   Facundo Wilson DO   aspirin 81 MG EC tablet Take 1 tablet by mouth daily    Provider, MD Gwendolyn   Multiple Vitamins-Minerals (MULTIVITAMIN PO) Take  by mouth daily.    Provider, MD Gwendolyn          HPI: Patient seen today medical follow-up on recent aortic valve replacement and aortic aneurysm repair.  Medically he is doing very well postsurgery.  Surgery on 2024 and discharged home very good condition 2024.  Currently no limitations from pain.  Ambulating and doing well.  Denies any significant shortness of breath.  Evaluated yesterday through the [facility] CBC CMP both stable.  Chest x-ray with right pleural effusion and I did encourage his activity as well as use of incentive spirometry.          Review of Systems   Constitutional: Negative.    HENT: Negative.     Eyes: Negative.    Respiratory: Negative.     Gastrointestinal: Negative.    Endocrine: Negative.    Genitourinary: Negative.    Musculoskeletal: Negative.    Skin: Negative.    Allergic/Immunologic: Negative.    Neurological: Negative.    Hematological: Negative.    Psychiatric/Behavioral: Negative.        Today systems review is very stable.  Incision site is healing well.  Activity is very good.  Medications

## 2025-01-07 SDOH — ECONOMIC STABILITY: FOOD INSECURITY: WITHIN THE PAST 12 MONTHS, THE FOOD YOU BOUGHT JUST DIDN'T LAST AND YOU DIDN'T HAVE MONEY TO GET MORE.: NEVER TRUE

## 2025-01-07 SDOH — ECONOMIC STABILITY: TRANSPORTATION INSECURITY
IN THE PAST 12 MONTHS, HAS LACK OF TRANSPORTATION KEPT YOU FROM MEETINGS, WORK, OR FROM GETTING THINGS NEEDED FOR DAILY LIVING?: NO

## 2025-01-07 SDOH — ECONOMIC STABILITY: INCOME INSECURITY: HOW HARD IS IT FOR YOU TO PAY FOR THE VERY BASICS LIKE FOOD, HOUSING, MEDICAL CARE, AND HEATING?: NOT HARD AT ALL

## 2025-01-07 SDOH — ECONOMIC STABILITY: FOOD INSECURITY: WITHIN THE PAST 12 MONTHS, YOU WORRIED THAT YOUR FOOD WOULD RUN OUT BEFORE YOU GOT MONEY TO BUY MORE.: NEVER TRUE

## 2025-01-10 ENCOUNTER — OFFICE VISIT (OUTPATIENT)
Dept: PRIMARY CARE CLINIC | Age: 70
End: 2025-01-10
Payer: MEDICARE

## 2025-01-10 VITALS
TEMPERATURE: 97.8 F | WEIGHT: 192 LBS | HEART RATE: 62 BPM | DIASTOLIC BLOOD PRESSURE: 60 MMHG | BODY MASS INDEX: 26.88 KG/M2 | OXYGEN SATURATION: 99 % | HEIGHT: 71 IN | SYSTOLIC BLOOD PRESSURE: 100 MMHG

## 2025-01-10 DIAGNOSIS — Z98.890 HISTORY OF AORTIC VALVE REPAIR: ICD-10-CM

## 2025-01-10 DIAGNOSIS — I10 ESSENTIAL HYPERTENSION: Primary | ICD-10-CM

## 2025-01-10 DIAGNOSIS — E78.00 PURE HYPERCHOLESTEROLEMIA: ICD-10-CM

## 2025-01-10 DIAGNOSIS — Z86.79 HISTORY OF AORTIC VALVE REPAIR: ICD-10-CM

## 2025-01-10 DIAGNOSIS — K21.00 GASTROESOPHAGEAL REFLUX DISEASE WITH ESOPHAGITIS WITHOUT HEMORRHAGE: ICD-10-CM

## 2025-01-10 PROCEDURE — 1036F TOBACCO NON-USER: CPT | Performed by: FAMILY MEDICINE

## 2025-01-10 PROCEDURE — G8427 DOCREV CUR MEDS BY ELIG CLIN: HCPCS | Performed by: FAMILY MEDICINE

## 2025-01-10 PROCEDURE — G8417 CALC BMI ABV UP PARAM F/U: HCPCS | Performed by: FAMILY MEDICINE

## 2025-01-10 PROCEDURE — 3017F COLORECTAL CA SCREEN DOC REV: CPT | Performed by: FAMILY MEDICINE

## 2025-01-10 PROCEDURE — 3074F SYST BP LT 130 MM HG: CPT | Performed by: FAMILY MEDICINE

## 2025-01-10 PROCEDURE — 99214 OFFICE O/P EST MOD 30 MIN: CPT | Performed by: FAMILY MEDICINE

## 2025-01-10 PROCEDURE — 1123F ACP DISCUSS/DSCN MKR DOCD: CPT | Performed by: FAMILY MEDICINE

## 2025-01-10 PROCEDURE — 3078F DIAST BP <80 MM HG: CPT | Performed by: FAMILY MEDICINE

## 2025-01-10 PROCEDURE — 1159F MED LIST DOCD IN RCRD: CPT | Performed by: FAMILY MEDICINE

## 2025-01-10 ASSESSMENT — PATIENT HEALTH QUESTIONNAIRE - PHQ9
SUM OF ALL RESPONSES TO PHQ QUESTIONS 1-9: 0
SUM OF ALL RESPONSES TO PHQ QUESTIONS 1-9: 0
2. FEELING DOWN, DEPRESSED OR HOPELESS: NOT AT ALL
SUM OF ALL RESPONSES TO PHQ9 QUESTIONS 1 & 2: 0
1. LITTLE INTEREST OR PLEASURE IN DOING THINGS: NOT AT ALL
SUM OF ALL RESPONSES TO PHQ9 QUESTIONS 1 & 2: 0
SUM OF ALL RESPONSES TO PHQ QUESTIONS 1-9: 0
2. FEELING DOWN, DEPRESSED OR HOPELESS: NOT AT ALL
1. LITTLE INTEREST OR PLEASURE IN DOING THINGS: NOT AT ALL
SUM OF ALL RESPONSES TO PHQ QUESTIONS 1-9: 0

## 2025-01-10 ASSESSMENT — ENCOUNTER SYMPTOMS
ALLERGIC/IMMUNOLOGIC NEGATIVE: 1
GASTROINTESTINAL NEGATIVE: 1
RESPIRATORY NEGATIVE: 1
EYES NEGATIVE: 1

## 2025-01-10 NOTE — PROGRESS NOTES
1/10/25     Robin Ricci    : 1955 Sex: male   Age: 69 y.o.      Chief Complaint   Patient presents with    Follow-up     Pt here for follow up on open heart surgery.       Prior to Admission medications    Medication Sig Start Date End Date Taking? Authorizing Provider   atorvastatin (LIPITOR) 10 MG tablet TAKE 1 TABLET DAILY 24   Facundo Wilson DO   pantoprazole (PROTONIX) 40 MG tablet TAKE 1 TABLET DAILY 24   Facundo Wilson DO   terazosin (HYTRIN) 5 MG capsule TAKE 1 CAPSULE NIGHTLY 24   Facundo Wilson DO   metoprolol succinate (TOPROL XL) 25 MG extended release tablet TAKE ONE-HALF (1/2) TABLET DAILY 24   Facundo iWlson DO   tobramycin (TOBREX) 0.3 % ophthalmic solution 2 gtts qid 10/2/23   Facundo Wilson DO   aspirin 81 MG EC tablet Take 1 tablet by mouth daily    Provider, MD Gwendolyn   Multiple Vitamins-Minerals (MULTIVITAMIN PO) Take  by mouth daily.    Provider, MD Gwendolyn          HPI: Patient seen today with hypertension history of recent aortic valve repair and aortic sleeve repair doing exceptionally well.  Has recovered nicely.  Incision site looks great.  He is back to regular exercise.  No complaints of chest pain no shortness of breath and managing very well with current medications.  Reflux disease stable.  Hyperlipidemia stable.  Meds to continue as prescribed.          Review of Systems   Constitutional: Negative.    HENT: Negative.     Eyes: Negative.    Respiratory: Negative.     Gastrointestinal: Negative.    Endocrine: Negative.    Genitourinary: Negative.    Musculoskeletal: Negative.    Skin: Negative.    Allergic/Immunologic: Negative.    Neurological: Negative.    Hematological: Negative.    Psychiatric/Behavioral: Negative.        Today systems review is overall stable we will maintain current care.  He is 3 to 4 weeks out from surgery and he may resume driving privileges next week.  Exercises daily and managing very

## 2025-01-12 ENCOUNTER — APPOINTMENT (OUTPATIENT)
Dept: GENERAL RADIOLOGY | Age: 70
End: 2025-01-12
Payer: MEDICARE

## 2025-01-12 ENCOUNTER — APPOINTMENT (OUTPATIENT)
Dept: CT IMAGING | Age: 70
End: 2025-01-12
Payer: MEDICARE

## 2025-01-12 ENCOUNTER — HOSPITAL ENCOUNTER (EMERGENCY)
Age: 70
Discharge: ANOTHER HOSPITAL | End: 2025-01-12
Attending: EMERGENCY MEDICINE
Payer: MEDICARE

## 2025-01-12 VITALS
SYSTOLIC BLOOD PRESSURE: 103 MMHG | BODY MASS INDEX: 25.9 KG/M2 | TEMPERATURE: 97.3 F | OXYGEN SATURATION: 98 % | WEIGHT: 185 LBS | HEIGHT: 71 IN | RESPIRATION RATE: 17 BRPM | HEART RATE: 50 BPM | DIASTOLIC BLOOD PRESSURE: 71 MMHG

## 2025-01-12 DIAGNOSIS — I48.91 ATRIAL FIBRILLATION WITH RVR (HCC): Primary | ICD-10-CM

## 2025-01-12 LAB
ALBUMIN SERPL-MCNC: 3.9 G/DL (ref 3.5–5.2)
ALP SERPL-CCNC: 110 U/L (ref 40–129)
ALT SERPL-CCNC: 10 U/L (ref 0–40)
ANION GAP SERPL CALCULATED.3IONS-SCNC: 8 MMOL/L (ref 7–16)
AST SERPL-CCNC: 18 U/L (ref 0–39)
BASOPHILS # BLD: 0.1 K/UL (ref 0–0.2)
BASOPHILS NFR BLD: 1 % (ref 0–2)
BILIRUB SERPL-MCNC: 0.4 MG/DL (ref 0–1.2)
BNP SERPL-MCNC: 2731 PG/ML (ref 0–125)
BUN SERPL-MCNC: 9 MG/DL (ref 6–23)
CALCIUM SERPL-MCNC: 9 MG/DL (ref 8.6–10.2)
CHLORIDE SERPL-SCNC: 99 MMOL/L (ref 98–107)
CO2 SERPL-SCNC: 28 MMOL/L (ref 22–29)
CREAT SERPL-MCNC: 0.9 MG/DL (ref 0.7–1.2)
D-DIMER QUANTITATIVE: 342 NG/ML DDU (ref 0–230)
EOSINOPHIL # BLD: 0.19 K/UL (ref 0.05–0.5)
EOSINOPHILS RELATIVE PERCENT: 3 % (ref 0–6)
ERYTHROCYTE [DISTWIDTH] IN BLOOD BY AUTOMATED COUNT: 12.5 % (ref 11.5–15)
ERYTHROCYTE [DISTWIDTH] IN BLOOD BY AUTOMATED COUNT: 12.7 % (ref 11.5–15)
GFR, ESTIMATED: 89 ML/MIN/1.73M2
GLUCOSE SERPL-MCNC: 141 MG/DL (ref 74–99)
HCT VFR BLD AUTO: 38.1 % (ref 37–54)
HCT VFR BLD AUTO: 39.5 % (ref 37–54)
HGB BLD-MCNC: 12.3 G/DL (ref 12.5–16.5)
HGB BLD-MCNC: 12.6 G/DL (ref 12.5–16.5)
IMM GRANULOCYTES # BLD AUTO: 0.03 K/UL (ref 0–0.58)
IMM GRANULOCYTES NFR BLD: 0 % (ref 0–5)
LYMPHOCYTES NFR BLD: 1.7 K/UL (ref 1.5–4)
LYMPHOCYTES RELATIVE PERCENT: 22 % (ref 20–42)
MCH RBC QN AUTO: 31.9 PG (ref 26–35)
MCH RBC QN AUTO: 32 PG (ref 26–35)
MCHC RBC AUTO-ENTMCNC: 31.9 G/DL (ref 32–34.5)
MCHC RBC AUTO-ENTMCNC: 32.3 G/DL (ref 32–34.5)
MCV RBC AUTO: 100 FL (ref 80–99.9)
MCV RBC AUTO: 99.2 FL (ref 80–99.9)
MONOCYTES NFR BLD: 0.71 K/UL (ref 0.1–0.95)
MONOCYTES NFR BLD: 9 % (ref 2–12)
NEUTROPHILS NFR BLD: 65 % (ref 43–80)
NEUTS SEG NFR BLD: 4.96 K/UL (ref 1.8–7.3)
PARTIAL THROMBOPLASTIN TIME: 32.4 SEC (ref 24.5–35.1)
PLATELET # BLD AUTO: 222 K/UL (ref 130–450)
PLATELET # BLD AUTO: 239 K/UL (ref 130–450)
PMV BLD AUTO: 9.6 FL (ref 7–12)
PMV BLD AUTO: 9.8 FL (ref 7–12)
POTASSIUM SERPL-SCNC: 4.1 MMOL/L (ref 3.5–5)
PROT SERPL-MCNC: 6.7 G/DL (ref 6.4–8.3)
RBC # BLD AUTO: 3.84 M/UL (ref 3.8–5.8)
RBC # BLD AUTO: 3.95 M/UL (ref 3.8–5.8)
SODIUM SERPL-SCNC: 135 MMOL/L (ref 132–146)
TROPONIN I SERPL HS-MCNC: 19 NG/L (ref 0–11)
TROPONIN I SERPL HS-MCNC: 21 NG/L (ref 0–11)
WBC OTHER # BLD: 7.7 K/UL (ref 4.5–11.5)
WBC OTHER # BLD: 9 K/UL (ref 4.5–11.5)

## 2025-01-12 PROCEDURE — 71046 X-RAY EXAM CHEST 2 VIEWS: CPT

## 2025-01-12 PROCEDURE — 83880 ASSAY OF NATRIURETIC PEPTIDE: CPT

## 2025-01-12 PROCEDURE — 84484 ASSAY OF TROPONIN QUANT: CPT

## 2025-01-12 PROCEDURE — 85025 COMPLETE CBC W/AUTO DIFF WBC: CPT

## 2025-01-12 PROCEDURE — 80053 COMPREHEN METABOLIC PANEL: CPT

## 2025-01-12 PROCEDURE — 2580000003 HC RX 258: Performed by: EMERGENCY MEDICINE

## 2025-01-12 PROCEDURE — 71275 CT ANGIOGRAPHY CHEST: CPT

## 2025-01-12 PROCEDURE — 85730 THROMBOPLASTIN TIME PARTIAL: CPT

## 2025-01-12 PROCEDURE — 85027 COMPLETE CBC AUTOMATED: CPT

## 2025-01-12 PROCEDURE — 96376 TX/PRO/DX INJ SAME DRUG ADON: CPT

## 2025-01-12 PROCEDURE — 2500000003 HC RX 250 WO HCPCS: Performed by: EMERGENCY MEDICINE

## 2025-01-12 PROCEDURE — 96365 THER/PROPH/DIAG IV INF INIT: CPT

## 2025-01-12 PROCEDURE — 99285 EMERGENCY DEPT VISIT HI MDM: CPT

## 2025-01-12 PROCEDURE — 6360000002 HC RX W HCPCS: Performed by: EMERGENCY MEDICINE

## 2025-01-12 PROCEDURE — 96366 THER/PROPH/DIAG IV INF ADDON: CPT

## 2025-01-12 PROCEDURE — 85379 FIBRIN DEGRADATION QUANT: CPT

## 2025-01-12 PROCEDURE — 6360000004 HC RX CONTRAST MEDICATION: Performed by: RADIOLOGY

## 2025-01-12 PROCEDURE — 96368 THER/DIAG CONCURRENT INF: CPT

## 2025-01-12 RX ORDER — HEPARIN SODIUM 10000 [USP'U]/100ML
5-30 INJECTION, SOLUTION INTRAVENOUS CONTINUOUS
Status: DISCONTINUED | OUTPATIENT
Start: 2025-01-12 | End: 2025-01-13 | Stop reason: HOSPADM

## 2025-01-12 RX ORDER — DILTIAZEM HYDROCHLORIDE 5 MG/ML
10 INJECTION INTRAVENOUS ONCE
Status: COMPLETED | OUTPATIENT
Start: 2025-01-12 | End: 2025-01-12

## 2025-01-12 RX ORDER — IOPAMIDOL 755 MG/ML
75 INJECTION, SOLUTION INTRAVASCULAR
Status: COMPLETED | OUTPATIENT
Start: 2025-01-12 | End: 2025-01-12

## 2025-01-12 RX ORDER — HEPARIN SODIUM 1000 [USP'U]/ML
4000 INJECTION, SOLUTION INTRAVENOUS; SUBCUTANEOUS PRN
Status: DISCONTINUED | OUTPATIENT
Start: 2025-01-12 | End: 2025-01-13 | Stop reason: HOSPADM

## 2025-01-12 RX ORDER — 0.9 % SODIUM CHLORIDE 0.9 %
1000 INTRAVENOUS SOLUTION INTRAVENOUS ONCE
Status: COMPLETED | OUTPATIENT
Start: 2025-01-12 | End: 2025-01-12

## 2025-01-12 RX ORDER — 0.9 % SODIUM CHLORIDE 0.9 %
500 INTRAVENOUS SOLUTION INTRAVENOUS ONCE
Status: DISCONTINUED | OUTPATIENT
Start: 2025-01-12 | End: 2025-01-13 | Stop reason: HOSPADM

## 2025-01-12 RX ORDER — HEPARIN SODIUM 1000 [USP'U]/ML
2000 INJECTION, SOLUTION INTRAVENOUS; SUBCUTANEOUS PRN
Status: DISCONTINUED | OUTPATIENT
Start: 2025-01-12 | End: 2025-01-13 | Stop reason: HOSPADM

## 2025-01-12 RX ADMIN — SODIUM CHLORIDE 2.5 MG/HR: 900 INJECTION, SOLUTION INTRAVENOUS at 16:20

## 2025-01-12 RX ADMIN — HEPARIN SODIUM 11 UNITS/KG/HR: 10000 INJECTION, SOLUTION INTRAVENOUS at 20:37

## 2025-01-12 RX ADMIN — IOPAMIDOL 75 ML: 755 INJECTION, SOLUTION INTRAVENOUS at 18:12

## 2025-01-12 RX ADMIN — DILTIAZEM HYDROCHLORIDE 10 MG: 5 INJECTION, SOLUTION INTRAVENOUS at 16:20

## 2025-01-12 RX ADMIN — SODIUM CHLORIDE 1000 ML: 9 INJECTION, SOLUTION INTRAVENOUS at 14:58

## 2025-01-12 ASSESSMENT — LIFESTYLE VARIABLES
HOW OFTEN DO YOU HAVE A DRINK CONTAINING ALCOHOL: NEVER
HOW MANY STANDARD DRINKS CONTAINING ALCOHOL DO YOU HAVE ON A TYPICAL DAY: 1 OR 2

## 2025-01-12 ASSESSMENT — PAIN - FUNCTIONAL ASSESSMENT: PAIN_FUNCTIONAL_ASSESSMENT: NONE - DENIES PAIN

## 2025-01-12 NOTE — ED PROVIDER NOTES
Decision To Transfer 01/12/2025 07:47:00 PM      PATIENT REFERRED TO:  No follow-up provider specified.    DISCHARGE MEDICATIONS:  Discharge Medication List as of 1/13/2025 12:12 AM          DISCONTINUED MEDICATIONS:  Discharge Medication List as of 1/13/2025 12:12 AM                 (Please note that portions of this note were completed with a voice recognition program.  Efforts were made to edit the dictations but occasionally words are mis-transcribed.)    Jade Martinez MD (electronically signed)           Jade Martinez MD  01/14/25 0808

## 2025-01-13 NOTE — ED NOTES
Made Dr Martinez aware that patients rhythm looked like it changed to sinus rhythm on the monitor. She stated \"I am not here\". I stated to her we were going to do a repeat EKG, she stated \"Let Dr Bowen know and she can reassess him and call [facility] back if she feels like its necessary.\"   Order placed for EKG at this time.

## 2025-01-13 NOTE — PROGRESS NOTES
Spoke with Deanna Guy, [facility] @1928. She informed me that the patient has been accepted by [facility], and that the accepting is . I informed her that  spoke with , with cardiothoracic surgery. She informed me that the patient will be going under his original 's service, [facility], and he is the accepting.

## 2025-01-17 LAB
EKG ATRIAL RATE: 131 BPM
EKG ATRIAL RATE: 288 BPM
EKG ATRIAL RATE: 69 BPM
EKG P AXIS: 28 DEGREES
EKG P-R INTERVAL: 128 MS
EKG Q-T INTERVAL: 262 MS
EKG Q-T INTERVAL: 366 MS
EKG Q-T INTERVAL: 386 MS
EKG QRS DURATION: 110 MS
EKG QRS DURATION: 114 MS
EKG QRS DURATION: 116 MS
EKG QTC CALCULATION (BAZETT): 386 MS
EKG QTC CALCULATION (BAZETT): 392 MS
EKG QTC CALCULATION (BAZETT): 422 MS
EKG R AXIS: -11 DEGREES
EKG R AXIS: -31 DEGREES
EKG R AXIS: -33 DEGREES
EKG T AXIS: 100 DEGREES
EKG T AXIS: 155 DEGREES
EKG T AXIS: 90 DEGREES
EKG VENTRICULAR RATE: 131 BPM
EKG VENTRICULAR RATE: 69 BPM
EKG VENTRICULAR RATE: 72 BPM

## 2025-01-24 ENCOUNTER — OFFICE VISIT (OUTPATIENT)
Dept: CARDIOLOGY CLINIC | Age: 70
End: 2025-01-24

## 2025-01-24 ENCOUNTER — TELEPHONE (OUTPATIENT)
Dept: CARDIOLOGY CLINIC | Age: 70
End: 2025-01-24

## 2025-01-24 VITALS
OXYGEN SATURATION: 98 % | BODY MASS INDEX: 26.4 KG/M2 | SYSTOLIC BLOOD PRESSURE: 122 MMHG | HEART RATE: 48 BPM | WEIGHT: 188.6 LBS | DIASTOLIC BLOOD PRESSURE: 70 MMHG | TEMPERATURE: 97.3 F | RESPIRATION RATE: 18 BRPM | HEIGHT: 71 IN

## 2025-01-24 DIAGNOSIS — Q23.81 BICUSPID AORTIC VALVE: Primary | ICD-10-CM

## 2025-01-24 DIAGNOSIS — Z95.2 S/P AVR: ICD-10-CM

## 2025-01-24 DIAGNOSIS — I48.0 PAF (PAROXYSMAL ATRIAL FIBRILLATION) (HCC): ICD-10-CM

## 2025-01-24 RX ORDER — METOPROLOL TARTRATE 25 MG/1
12.5 TABLET, FILM COATED ORAL 2 TIMES DAILY
Qty: 90 TABLET | Refills: 3 | Status: SHIPPED | OUTPATIENT
Start: 2025-01-24

## 2025-01-24 NOTE — TELEPHONE ENCOUNTER
Per Dr. Casillas, check with Dr. Sibley at [facility] to see when patient is cleared to start Cardiac Rehab.     Spoke with Arelis in Dr. Sibley's office.  Per Arelis, patient can start cardiac rehab (4-6) weeks after discharge from 12/22/24, which would be around 2/2/25. Patient was still in the office having Zio applied and he was notified.

## 2025-01-24 NOTE — PROGRESS NOTES
Patient was seen today and a 28 day monitor was placed. Monitor was ordered by Dr. Casillas. The monitor was applied, instructions were given to the patient. Patient stated understanding and gave verbalize readback.   Monitor company: [employer]  Serial number: [id number]  
palpitations.  He has been seen by electrophysiology.  Postop atrial fibrillation.  Spontaneously converted with IV Cardizem.  Hypertension, well controled at this time.    Hypercholesterolemia        Recommendations:  He is following the cholesterol with Dr. Wilson.    Echo at [facility] 1/13/2025: EF 57%.  AVR: Peak gradient 15, mean gradient 6 mmHg.  No aortic regurgitation.  ZIO AT 28-day monitor regarding the postop atrial fibrillation.  Referral to cardiac rehab when cleared by CT surgery.        Thank you for allowing me to participate in your patient's care.      Marco Antonio Casillas DO  FAC, SCAI  Interventional Cardiology

## 2025-01-30 ENCOUNTER — TELEPHONE (OUTPATIENT)
Dept: CARDIAC REHAB | Age: 70
End: 2025-01-30

## 2025-01-30 NOTE — TELEPHONE ENCOUNTER
Called patient to see if interested in scheduling for outpatient cardiac rehab. Pt states he is working out on his own and is not interested in scheduling at this time. Thank you for the referral    Cardiology Progress Note    Name:Kike Mo  DOS: 2023  : 1961  Suzanne Montero MD      Summary:    62 Y M w/h/o remote DVT, Dyslipidemia, s/p splenic artery aneurysm repair and remote bladder Ca, here to see me as a new patient for syncope.    Subjective:  In brief, patient tells me that the episode in question happened after standing up from sitting.  Felt lightheaded and perhaps a bit nauseous and then was on floor.  LOC was only a couple seconds.  He was found to be significantly anemic as part of the workup, which has now been corrected.    He has done well since his anemia was corrected with really no issues since.    Functionally, he is doing light activity without issues and planning to slowly increase to more regular mild-moderately strenuous actiivty such as brisk walking now that he is feeling better.    He works as a  for [employer], so has been doing some light lifting as well, again with no CV issues.    Denies any recurrent lightheadedness or presyncope.  No CP, SOB or palpitations.     He had a stress test, echo and event monitor in the last 1-2 months, all reviewed today and noted below.    FMH:negative for SCD.  Mother had CABG ~ age 58.    Previous smoker, quit 35+ years ago.    Drinks wine rarely/socially.    Does take BPs at home but admits it is only prob 2-3 times a month.  SBP mostly low 130s from what he recalls.    No other complaints.      Past Medical History:  Past Medical History:   Diagnosis Date   • Anemia     iron deficiency   • Aneurysm of superior mesenteric artery (CMD)    • Benign essential HTN 2018   • Blood clot associated with vein wall inflammation    • Chronic pain     neck   • Dyslipidemia    • Gastroesophageal reflux disease    • Malignant neoplasm of posterior wall of urinary bladder (CMD)    • Osteoarthritis    • PONV (postoperative nausea and vomiting)    • Renal artery aneurysm (CMD)          MEDS:  Current Outpatient  Medications   Medication Sig Dispense Refill   • losartan (COZAAR) 50 MG tablet Take 2 tablets by mouth every morning. 180 tablet 1   • potassium chloride (Klor-Con M) 10 MEQ kaila ER tablet Take 1 tablet by mouth daily. 30 tablet 5   • ferrous gluconate (FERGON) 324 (38 Fe) MG tablet Take 1 tablet by mouth in the morning and 1 tablet in the evening. 60 tablet 11   • hydroCHLOROthiazide (HYDRODIURIL) 25 MG tablet Take 1 tablet by mouth daily. 90 tablet 0   • omeprazole (PrilOSEC) 20 MG capsule Take 1 capsule by mouth daily. 90 capsule 1     No current facility-administered medications for this visit.         Rhythm: Sinus Rhythm      A complete review of system was used in the assessment of the patient. Pertinent positives and negatives are stated above.  All remaining systems were reviewed and are negative        Physical Exam  There were no vitals taken for this visit.     Visit Vitals  BP (!) 151/88 (BP Location: LUE - Left upper extremity, Patient Position: Sitting, Cuff Size: Large Adult)   Pulse 61   Ht 6' (1.829 m)   Wt 101.6 kg (224 lb)   BMI 30.38 kg/m²     Recheck 137/79    CONST: NAD, A&O x 3 and conversant  HEENT: no JVD no carotid bruit no lymphadenopathy  CV: S1 phys split S2 RRR no m/r/g PP2+  RESP: CTA BL no w/r/r    SKIN: no significant edema; no rash        Labs:  Lab Results   Component Value Date    SODIUM 143 06/06/2023    CHLORIDE 108 06/06/2023    CO2 26 06/06/2023    POTASSIUM 3.2 (L) 06/06/2023    BUN 19 06/06/2023    CREATININE 0.94 06/06/2023    GLUCOSE 123 (H) 06/06/2023     Lab Results   Component Value Date    WBC 5.1 06/06/2023    HCT 45.2 06/06/2023    HGB 14.4 06/06/2023     06/06/2023     Lab Results   Component Value Date    CHOLESTEROL 117 03/17/2023    HDL 32 (L) 03/17/2023    CALCLDL 71 03/17/2023    TRIGLYCERIDE 70 03/17/2023               Imaging:  ECG:  Normal sinus rhythm   Nonspecific ST abnormality     ECHO: 4/2023  Normal wall thickness and systolic function with no  regional wall motion abnormalities. LV EF 60 %.  LV chamber is mild-moderately dilated by dimensions but within normal limits by LV volume.  mild basal septal hypertrophy without evidence of obstruction.  Mildly increased left ventricular diastolic filling pressure.  Normal right ventricular size and systolic function.  Mild aortic valve regurgitation.  When compared to the previous echo report from 2018, no significant change (similar LV dimension/size measurements it appears).    Stress TEST 2023    CONCLUSION:   1.  There is no evidence of myocardial ischemia.  2.  There is no evidence of myocardial infarction.  3.  Normal left ventricular size and systolic function.    CT PE   IMPRESSION:   1. Negative for pulmonary embolus. No acute abnormality in the chest.  2. Incidental findings again noted including prominent hiatal hernia with  adjacent lymph nodes stable. No increasing adenopathy.  3. Upper abdominal findings incompletely evaluated. Soft tissue fullness  near the portal vein and pancreatic head is poorly defined. Cystic liver  lesions and lesion central left kidney again noted.       EVENT MONITOR 2023  Conclusion:   1. The baseline rhythm is sinus from  50 to 135 bpm  ; but peak HR   212     bpm. Avr   Bpm;  Max  bpm during SVT episode on 2023.     2. Occasional PACs and short atrial runs were noted.   PACs were not  adequately identified by the digital system  during the monitoring period   19,626 PVCs with PVC burden of 1%     Supra ventricular episode : SVT occurred 1 time(s) with fastest run 212 BPM   (related to  atrial tachycardia  ) on 2023  At 11;10 CDT  ,  14 beats self limited  automatically triggered. No symptoms reported or associated to this event.       3. No evidence of  atrial fibrillation or flutter, pauses, significant AV blocks, or ventricular tachycardias noted.     4.    14 events were transmitted.  0 patient triggered; 14 auto triggered .       No   symptomatic events  were reported and were not associated with any significant abnormalities in rhythm.     14 auto triggered only associated to sinus rhythm and occasional   PVCs  With one episode of SVT as mentioned above: HR 68 to 135 bpm       Occasional heart rate variation during sleep (by Somnet algorithm ) may suggest sleep breathing disturbance but not definitive. Please correlate clinically.  Assessment/Plan  Syncope-in setting of significant anemia  No recurrence since anemia corrected  Remote bladder Ca  History of remote palpitations  Negative stress test  Benign Event Monitor  Normal EF by echo  HTN    Plan:  Reassurance overall; both negative cardiac workup and timeline (no recurrence/resolution with correction of anemia) strongly point towards a non-cardiac cause  Continue losartan/HCTZ per PCP; call if home SBPs consistently >135 (would take ~ 2x /week)  Encouraged to get back into regular walking with goal 30+ min at least 5 days a week (he is already nearly meeting this goal)    Given the above, he can see me prn going forward.      Yrn Hooks D.O., Interventional Cardiology    Pager [phone]

## 2025-02-17 ENCOUNTER — TELEPHONE (OUTPATIENT)
Dept: CARDIOLOGY CLINIC | Age: 70
End: 2025-02-17

## 2025-02-17 DIAGNOSIS — I49.5 SINUS NODE DYSFUNCTION (HCC): ICD-10-CM

## 2025-02-17 DIAGNOSIS — I47.20 VT (VENTRICULAR TACHYCARDIA) (HCC): ICD-10-CM

## 2025-02-17 DIAGNOSIS — I47.10 SVT (SUPRAVENTRICULAR TACHYCARDIA): Primary | ICD-10-CM

## 2025-02-17 NOTE — TELEPHONE ENCOUNTER
I called him and discussed these results with him.  He is having episodes of sinus node dysfunction and bradycardia as well as episodes of runs of nonsustained supraventricular tachycardia and runs of nonsustained ventricular tachycardia.  I discussed this with electrophysiology.  I would like him to be seen by electrophysiology as soon as possible (urgent!).  He states that he is not having any symptoms.  I discussed with him that if he feels any symptoms such as lightheadedness, dizziness, feeling like he is going to pass out, or palpitations he is to go directly to the emergency room.  He states he understands.

## 2025-02-18 NOTE — TELEPHONE ENCOUNTER
Per Karin in the [place] office, patient was called and was left a voicemail to call to schedule.

## 2025-02-19 NOTE — TELEPHONE ENCOUNTER
Patient is scheduled for 2/25/25 at 10:20 a.m.  Spoke with patient and confirmed that he is aware of the appointment.

## 2025-02-24 NOTE — PROGRESS NOTES
[facility]- [facility] [place] [place] Electrophysiology  Consultation Report  PATIENT: Robin Ricci  MEDICAL RECORD NUMBER: [id number]  DATE OF SERVICE:  2/25/2025  ATTENDING ELECTROPHYSIOLOGIST: Lida Delatorre MD  REFERRING PHYSICIAN: Marco Antonio Casillas DO and Facundo Wilson DO  CHIEF COMPLAINT:   Chief Complaint   Patient presents with    New Patient     SVT ref by Dr. Kamara..the patient has no c/c         HPI: This is a 69 y.o. male with a history of bicuspid aortic valve, aortic stenosis, recurrent palpitations who presents to cardiac electrophysiology clinic for consultation.   He started noticing symptoms of palpitations more than 10 years ago and was initially on 25 mg of metoprolol daily which was subsequently reduced to only 6.25 mg daily.  In 2022 following his COVID vaccines the patient noticed an increase in symptoms of his palpitations--skipped beats which then led to increasing the dose of metoprolol back to 25 mg daily and an evaluation by electrophysiology Dr. Ordoñez who suggested conservative management with continuation of his beta-blockade and close monitoring of his symptoms.  In December 2024 he underwent aortic valve surgery at [facility] as described below  1. Mini upper hemisternotomy   2. Aortic valve replacement (27mm inspiris)  3. Ascending aortoplasty     Most recently he has been complaining of recurrent symptoms of palpitations and was hospitalized with atrial fibrillation on 1/12/2025 again at [facility].  He converted spontaneously to sinus rhythm.  He is active and able to complete all his activities without any difficulty at home.  Exercises regularly at home.  He was given an event monitor by Dr. Casillas.  The first 14 days of the monitor recordings have been reviewed.  Nonsustained supraventricular tachycardia as well as 1 episode of  wide QRS rhythm-possibly ventricular tachycardia nonsustained was noted.  The patient is therefore referred for EP

## 2025-02-25 ENCOUNTER — OFFICE VISIT (OUTPATIENT)
Dept: NON INVASIVE DIAGNOSTICS | Age: 70
End: 2025-02-25

## 2025-02-25 VITALS
HEART RATE: 55 BPM | WEIGHT: 196 LBS | SYSTOLIC BLOOD PRESSURE: 122 MMHG | HEIGHT: 71 IN | DIASTOLIC BLOOD PRESSURE: 84 MMHG | BODY MASS INDEX: 27.44 KG/M2

## 2025-02-25 DIAGNOSIS — I47.10 SVT (SUPRAVENTRICULAR TACHYCARDIA): Primary | ICD-10-CM

## 2025-03-14 ENCOUNTER — RESULTS FOLLOW-UP (OUTPATIENT)
Dept: CARDIOLOGY CLINIC | Age: 70
End: 2025-03-14

## 2025-03-14 ENCOUNTER — TELEPHONE (OUTPATIENT)
Dept: CARDIOLOGY CLINIC | Age: 70
End: 2025-03-14

## 2025-03-14 NOTE — TELEPHONE ENCOUNTER
Marco Antonio Casillas, Nerissa Lima  ZIO #1: Sinus rhythm.  Average heart rate 54 bpm.  Frequent runs of PSVT  2 runs of NSVT  Occasional sinus bradycardia with heart rates in the 30s.    ZIO #2: Sinus rhythm.  Average 53 bpm.  Occasional sinus bradycardia with heart rates into the 30s.  Frequent runs of PSVT  2 runs of NSVT.    Please let him know that he is still having fast heart rhythms from the top and from the bottom of the heart and occasional slow heart rhythms.  I will forward these to his electrophysiologist.  Please follow-up with electrophysiology.  Please forward these to Dr. Delatorre

## 2025-04-10 ENCOUNTER — OFFICE VISIT (OUTPATIENT)
Dept: NON INVASIVE DIAGNOSTICS | Age: 70
End: 2025-04-10
Payer: MEDICARE

## 2025-04-10 VITALS
HEART RATE: 57 BPM | RESPIRATION RATE: 16 BRPM | BODY MASS INDEX: 27.72 KG/M2 | SYSTOLIC BLOOD PRESSURE: 110 MMHG | WEIGHT: 198 LBS | HEIGHT: 71 IN | DIASTOLIC BLOOD PRESSURE: 78 MMHG

## 2025-04-10 DIAGNOSIS — I47.10 SVT (SUPRAVENTRICULAR TACHYCARDIA): Primary | ICD-10-CM

## 2025-04-10 PROCEDURE — 1123F ACP DISCUSS/DSCN MKR DOCD: CPT | Performed by: INTERNAL MEDICINE

## 2025-04-10 PROCEDURE — G8427 DOCREV CUR MEDS BY ELIG CLIN: HCPCS | Performed by: INTERNAL MEDICINE

## 2025-04-10 PROCEDURE — 3017F COLORECTAL CA SCREEN DOC REV: CPT | Performed by: INTERNAL MEDICINE

## 2025-04-10 PROCEDURE — 1159F MED LIST DOCD IN RCRD: CPT | Performed by: INTERNAL MEDICINE

## 2025-04-10 PROCEDURE — 3078F DIAST BP <80 MM HG: CPT | Performed by: INTERNAL MEDICINE

## 2025-04-10 PROCEDURE — 93000 ELECTROCARDIOGRAM COMPLETE: CPT | Performed by: INTERNAL MEDICINE

## 2025-04-10 PROCEDURE — 99213 OFFICE O/P EST LOW 20 MIN: CPT | Performed by: INTERNAL MEDICINE

## 2025-04-10 PROCEDURE — 3074F SYST BP LT 130 MM HG: CPT | Performed by: INTERNAL MEDICINE

## 2025-04-10 PROCEDURE — 1160F RVW MEDS BY RX/DR IN RCRD: CPT | Performed by: INTERNAL MEDICINE

## 2025-04-10 PROCEDURE — G8417 CALC BMI ABV UP PARAM F/U: HCPCS | Performed by: INTERNAL MEDICINE

## 2025-04-10 PROCEDURE — 1036F TOBACCO NON-USER: CPT | Performed by: INTERNAL MEDICINE

## 2025-04-10 NOTE — PROGRESS NOTES
[facility]YS[facility]- [facility] [place]- [place] Electrophysiology  Outpatient progress note  PATIENT: Robin Ricci  MEDICAL RECORD NUMBER: [id number]  DATE OF SERVICE:  4/10/2025  ATTENDING ELECTROPHYSIOLOGIST: Lida Delatorre MD  REFERRING PHYSICIAN: No ref. provider found and Facundo Wilson DO  CHIEF COMPLAINT:   Chief Complaint   Patient presents with    SVT     6 week office visit. Pt has no c/c         HPI: 2/25/25:This is a 69 y.o. male with a history of bicuspid aortic valve, aortic stenosis, recurrent palpitations who presents to cardiac electrophysiology clinic for consultation.   He started noticing symptoms of palpitations more than 10 years ago and was initially on 25 mg of metoprolol daily which was subsequently reduced to only 6.25 mg daily.  In 2022 following his COVID vaccines the patient noticed an increase in symptoms of his palpitations--skipped beats which then led to increasing the dose of metoprolol back to 25 mg daily and an evaluation by electrophysiology Dr. Ordoñez who suggested conservative management with continuation of his beta-blockade and close monitoring of his symptoms.  In December 2024 he underwent aortic valve surgery at [facility] as described below  1. Mini upper hemisternotomy   2. Aortic valve replacement (27mm inspiris)  3. Ascending aortoplasty     Most recently he has been complaining of recurrent symptoms of palpitations and was hospitalized with atrial fibrillation on 1/12/2025 again at [facility].  He converted spontaneously to sinus rhythm.  He is active and able to complete all his activities without any difficulty at home.  Exercises regularly at home.  He was given an event monitor by Dr. Casillas.  The first 14 days of the monitor recordings have been reviewed.  Nonsustained supraventricular tachycardia as well as 1 episode of  wide QRS rhythm-possibly ventricular tachycardia nonsustained was noted.  The patient is therefore referred for EP

## 2025-08-25 ENCOUNTER — OFFICE VISIT (OUTPATIENT)
Dept: CARDIOLOGY CLINIC | Age: 70
End: 2025-08-25
Payer: MEDICARE

## 2025-08-25 VITALS
RESPIRATION RATE: 18 BRPM | OXYGEN SATURATION: 99 % | SYSTOLIC BLOOD PRESSURE: 110 MMHG | BODY MASS INDEX: 27.37 KG/M2 | HEIGHT: 71 IN | DIASTOLIC BLOOD PRESSURE: 74 MMHG | WEIGHT: 195.5 LBS | TEMPERATURE: 97.3 F | HEART RATE: 44 BPM

## 2025-08-25 DIAGNOSIS — I35.0 AORTIC VALVE STENOSIS, ETIOLOGY OF CARDIAC VALVE DISEASE UNSPECIFIED: ICD-10-CM

## 2025-08-25 DIAGNOSIS — Q23.81 BICUSPID AORTIC VALVE: ICD-10-CM

## 2025-08-25 DIAGNOSIS — I48.0 PAF (PAROXYSMAL ATRIAL FIBRILLATION) (HCC): Primary | ICD-10-CM

## 2025-08-25 DIAGNOSIS — I35.1 AORTIC VALVE INSUFFICIENCY, ETIOLOGY OF CARDIAC VALVE DISEASE UNSPECIFIED: ICD-10-CM

## 2025-08-25 DIAGNOSIS — I71.21 ANEURYSM OF ASCENDING AORTA WITHOUT RUPTURE: ICD-10-CM

## 2025-08-25 PROCEDURE — G2211 COMPLEX E/M VISIT ADD ON: HCPCS | Performed by: INTERNAL MEDICINE

## 2025-08-25 PROCEDURE — 3017F COLORECTAL CA SCREEN DOC REV: CPT | Performed by: INTERNAL MEDICINE

## 2025-08-25 PROCEDURE — 1123F ACP DISCUSS/DSCN MKR DOCD: CPT | Performed by: INTERNAL MEDICINE

## 2025-08-25 PROCEDURE — 3078F DIAST BP <80 MM HG: CPT | Performed by: INTERNAL MEDICINE

## 2025-08-25 PROCEDURE — G8417 CALC BMI ABV UP PARAM F/U: HCPCS | Performed by: INTERNAL MEDICINE

## 2025-08-25 PROCEDURE — 1036F TOBACCO NON-USER: CPT | Performed by: INTERNAL MEDICINE

## 2025-08-25 PROCEDURE — 93000 ELECTROCARDIOGRAM COMPLETE: CPT | Performed by: INTERNAL MEDICINE

## 2025-08-25 PROCEDURE — G8427 DOCREV CUR MEDS BY ELIG CLIN: HCPCS | Performed by: INTERNAL MEDICINE

## 2025-08-25 PROCEDURE — 3074F SYST BP LT 130 MM HG: CPT | Performed by: INTERNAL MEDICINE

## 2025-08-25 PROCEDURE — 99214 OFFICE O/P EST MOD 30 MIN: CPT | Performed by: INTERNAL MEDICINE

## 2025-08-25 PROCEDURE — 1159F MED LIST DOCD IN RCRD: CPT | Performed by: INTERNAL MEDICINE
